# Patient Record
Sex: MALE | Race: WHITE | Employment: OTHER | URBAN - METROPOLITAN AREA
[De-identification: names, ages, dates, MRNs, and addresses within clinical notes are randomized per-mention and may not be internally consistent; named-entity substitution may affect disease eponyms.]

---

## 2019-07-09 ENCOUNTER — ANESTHESIA EVENT (OUTPATIENT)
Dept: SURGERY | Facility: CLINIC | Age: 84
End: 2019-07-09
Payer: COMMERCIAL

## 2019-07-09 ENCOUNTER — ANESTHESIA (OUTPATIENT)
Dept: SURGERY | Facility: CLINIC | Age: 84
End: 2019-07-09
Payer: COMMERCIAL

## 2019-07-09 ENCOUNTER — APPOINTMENT (OUTPATIENT)
Dept: GENERAL RADIOLOGY | Facility: CLINIC | Age: 84
End: 2019-07-09
Attending: UROLOGY
Payer: COMMERCIAL

## 2019-07-09 ENCOUNTER — HOSPITAL ENCOUNTER (OUTPATIENT)
Facility: CLINIC | Age: 84
Discharge: HOME | End: 2019-07-09
Attending: UROLOGY | Admitting: UROLOGY
Payer: COMMERCIAL

## 2019-07-09 VITALS
RESPIRATION RATE: 18 BRPM | HEIGHT: 72 IN | BODY MASS INDEX: 23.04 KG/M2 | TEMPERATURE: 96.7 F | HEART RATE: 55 BPM | OXYGEN SATURATION: 98 % | DIASTOLIC BLOOD PRESSURE: 77 MMHG | WEIGHT: 170.1 LBS | SYSTOLIC BLOOD PRESSURE: 167 MMHG

## 2019-07-09 DIAGNOSIS — N20.1 BILATERAL URETERAL CALCULI: Primary | ICD-10-CM

## 2019-07-09 PROCEDURE — 25000128 H RX IP 250 OP 636: Performed by: NURSE ANESTHETIST, CERTIFIED REGISTERED

## 2019-07-09 PROCEDURE — 71000027 ZZH RECOVERY PHASE 2 EACH 15 MINS: Performed by: UROLOGY

## 2019-07-09 PROCEDURE — 40000170 ZZH STATISTIC PRE-PROCEDURE ASSESSMENT II: Performed by: UROLOGY

## 2019-07-09 PROCEDURE — 36000093 ZZH SURGERY LEVEL 4 1ST 30 MIN: Performed by: UROLOGY

## 2019-07-09 PROCEDURE — 36000063 ZZH SURGERY LEVEL 4 EA 15 ADDTL MIN: Performed by: UROLOGY

## 2019-07-09 PROCEDURE — 88300 SURGICAL PATH GROSS: CPT | Performed by: UROLOGY

## 2019-07-09 PROCEDURE — 37000009 ZZH ANESTHESIA TECHNICAL FEE, EACH ADDTL 15 MIN: Performed by: UROLOGY

## 2019-07-09 PROCEDURE — 27210794 ZZH OR GENERAL SUPPLY STERILE: Performed by: UROLOGY

## 2019-07-09 PROCEDURE — 71000013 ZZH RECOVERY PHASE 1 LEVEL 1 EA ADDTL HR: Performed by: UROLOGY

## 2019-07-09 PROCEDURE — 25800030 ZZH RX IP 258 OP 636: Performed by: NURSE ANESTHETIST, CERTIFIED REGISTERED

## 2019-07-09 PROCEDURE — 88300 SURGICAL PATH GROSS: CPT | Mod: 26 | Performed by: UROLOGY

## 2019-07-09 PROCEDURE — 25000566 ZZH SEVOFLURANE, EA 15 MIN: Performed by: UROLOGY

## 2019-07-09 PROCEDURE — 25000125 ZZHC RX 250: Performed by: NURSE ANESTHETIST, CERTIFIED REGISTERED

## 2019-07-09 PROCEDURE — 25000128 H RX IP 250 OP 636: Performed by: UROLOGY

## 2019-07-09 PROCEDURE — 25000125 ZZHC RX 250: Performed by: UROLOGY

## 2019-07-09 PROCEDURE — 25800030 ZZH RX IP 258 OP 636: Performed by: ANESTHESIOLOGY

## 2019-07-09 PROCEDURE — 40000277 XR SURGERY CARM FLUORO LESS THAN 5 MIN W STILLS

## 2019-07-09 PROCEDURE — 25800025 ZZH RX 258: Performed by: UROLOGY

## 2019-07-09 PROCEDURE — 82365 CALCULUS SPECTROSCOPY: CPT | Performed by: UROLOGY

## 2019-07-09 PROCEDURE — 37000008 ZZH ANESTHESIA TECHNICAL FEE, 1ST 30 MIN: Performed by: UROLOGY

## 2019-07-09 PROCEDURE — C1726 CATH, BAL DIL, NON-VASCULAR: HCPCS | Performed by: UROLOGY

## 2019-07-09 PROCEDURE — 71000012 ZZH RECOVERY PHASE 1 LEVEL 1 FIRST HR: Performed by: UROLOGY

## 2019-07-09 PROCEDURE — C1769 GUIDE WIRE: HCPCS | Performed by: UROLOGY

## 2019-07-09 RX ORDER — SODIUM CHLORIDE, SODIUM LACTATE, POTASSIUM CHLORIDE, CALCIUM CHLORIDE 600; 310; 30; 20 MG/100ML; MG/100ML; MG/100ML; MG/100ML
INJECTION, SOLUTION INTRAVENOUS CONTINUOUS PRN
Status: DISCONTINUED | OUTPATIENT
Start: 2019-07-09 | End: 2019-07-09

## 2019-07-09 RX ORDER — PROPOFOL 10 MG/ML
INJECTION, EMULSION INTRAVENOUS CONTINUOUS PRN
Status: DISCONTINUED | OUTPATIENT
Start: 2019-07-09 | End: 2019-07-09

## 2019-07-09 RX ORDER — ONDANSETRON 2 MG/ML
4 INJECTION INTRAMUSCULAR; INTRAVENOUS EVERY 30 MIN PRN
Status: DISCONTINUED | OUTPATIENT
Start: 2019-07-09 | End: 2019-07-09 | Stop reason: HOSPADM

## 2019-07-09 RX ORDER — FENTANYL CITRATE 50 UG/ML
INJECTION, SOLUTION INTRAMUSCULAR; INTRAVENOUS PRN
Status: DISCONTINUED | OUTPATIENT
Start: 2019-07-09 | End: 2019-07-09

## 2019-07-09 RX ORDER — ONDANSETRON 2 MG/ML
INJECTION INTRAMUSCULAR; INTRAVENOUS PRN
Status: DISCONTINUED | OUTPATIENT
Start: 2019-07-09 | End: 2019-07-09

## 2019-07-09 RX ORDER — SERTRALINE HYDROCHLORIDE 100 MG/1
100 TABLET, FILM COATED ORAL EVERY MORNING
COMMUNITY

## 2019-07-09 RX ORDER — DEXAMETHASONE SODIUM PHOSPHATE 4 MG/ML
INJECTION, SOLUTION INTRA-ARTICULAR; INTRALESIONAL; INTRAMUSCULAR; INTRAVENOUS; SOFT TISSUE PRN
Status: DISCONTINUED | OUTPATIENT
Start: 2019-07-09 | End: 2019-07-09

## 2019-07-09 RX ORDER — AMLODIPINE BESYLATE 10 MG/1
10 TABLET ORAL DAILY
COMMUNITY
End: 2020-07-17

## 2019-07-09 RX ORDER — PROPOFOL 10 MG/ML
INJECTION, EMULSION INTRAVENOUS PRN
Status: DISCONTINUED | OUTPATIENT
Start: 2019-07-09 | End: 2019-07-09

## 2019-07-09 RX ORDER — CEFAZOLIN SODIUM 2 G/100ML
2 INJECTION, SOLUTION INTRAVENOUS
Status: COMPLETED | OUTPATIENT
Start: 2019-07-09 | End: 2019-07-09

## 2019-07-09 RX ORDER — SODIUM CHLORIDE, SODIUM LACTATE, POTASSIUM CHLORIDE, CALCIUM CHLORIDE 600; 310; 30; 20 MG/100ML; MG/100ML; MG/100ML; MG/100ML
INJECTION, SOLUTION INTRAVENOUS CONTINUOUS
Status: DISCONTINUED | OUTPATIENT
Start: 2019-07-09 | End: 2019-07-09 | Stop reason: HOSPADM

## 2019-07-09 RX ORDER — ONDANSETRON 4 MG/1
4 TABLET, ORALLY DISINTEGRATING ORAL EVERY 30 MIN PRN
Status: DISCONTINUED | OUTPATIENT
Start: 2019-07-09 | End: 2019-07-09 | Stop reason: HOSPADM

## 2019-07-09 RX ORDER — NALOXONE HYDROCHLORIDE 0.4 MG/ML
.1-.4 INJECTION, SOLUTION INTRAMUSCULAR; INTRAVENOUS; SUBCUTANEOUS
Status: DISCONTINUED | OUTPATIENT
Start: 2019-07-09 | End: 2019-07-09 | Stop reason: HOSPADM

## 2019-07-09 RX ORDER — LIDOCAINE HYDROCHLORIDE 20 MG/ML
INJECTION, SOLUTION INFILTRATION; PERINEURAL PRN
Status: DISCONTINUED | OUTPATIENT
Start: 2019-07-09 | End: 2019-07-09

## 2019-07-09 RX ORDER — CIPROFLOXACIN 500 MG/1
500 TABLET, FILM COATED ORAL 2 TIMES DAILY
Qty: 14 TABLET | Refills: 0 | Status: SHIPPED | OUTPATIENT
Start: 2019-07-09 | End: 2019-11-18

## 2019-07-09 RX ORDER — MEPERIDINE HYDROCHLORIDE 25 MG/ML
12.5 INJECTION INTRAMUSCULAR; INTRAVENOUS; SUBCUTANEOUS
Status: DISCONTINUED | OUTPATIENT
Start: 2019-07-09 | End: 2019-07-09 | Stop reason: HOSPADM

## 2019-07-09 RX ORDER — TAMSULOSIN HYDROCHLORIDE 0.4 MG/1
0.4 CAPSULE ORAL DAILY
Status: ON HOLD | COMMUNITY
End: 2019-07-09

## 2019-07-09 RX ORDER — EPHEDRINE SULFATE 50 MG/ML
INJECTION, SOLUTION INTRAMUSCULAR; INTRAVENOUS; SUBCUTANEOUS PRN
Status: DISCONTINUED | OUTPATIENT
Start: 2019-07-09 | End: 2019-07-09

## 2019-07-09 RX ORDER — FENTANYL CITRATE 50 UG/ML
25-50 INJECTION, SOLUTION INTRAMUSCULAR; INTRAVENOUS
Status: DISCONTINUED | OUTPATIENT
Start: 2019-07-09 | End: 2019-07-09 | Stop reason: HOSPADM

## 2019-07-09 RX ORDER — DONEPEZIL HYDROCHLORIDE 10 MG/1
5 TABLET, FILM COATED ORAL EVERY MORNING
COMMUNITY

## 2019-07-09 RX ORDER — HYDROMORPHONE HYDROCHLORIDE 1 MG/ML
.3-.5 INJECTION, SOLUTION INTRAMUSCULAR; INTRAVENOUS; SUBCUTANEOUS EVERY 10 MIN PRN
Status: DISCONTINUED | OUTPATIENT
Start: 2019-07-09 | End: 2019-07-09 | Stop reason: HOSPADM

## 2019-07-09 RX ADMIN — ONDANSETRON 4 MG: 2 INJECTION INTRAMUSCULAR; INTRAVENOUS at 11:50

## 2019-07-09 RX ADMIN — SODIUM CHLORIDE, SODIUM LACTATE, POTASSIUM CHLORIDE, CALCIUM CHLORIDE: 600; 310; 30; 20 INJECTION, SOLUTION INTRAVENOUS at 11:49

## 2019-07-09 RX ADMIN — Medication 5 MG: at 11:51

## 2019-07-09 RX ADMIN — FENTANYL CITRATE 25 MCG: 50 INJECTION, SOLUTION INTRAMUSCULAR; INTRAVENOUS at 11:17

## 2019-07-09 RX ADMIN — CEFAZOLIN SODIUM 2 G: 2 INJECTION, SOLUTION INTRAVENOUS at 10:52

## 2019-07-09 RX ADMIN — LIDOCAINE HYDROCHLORIDE 40 MG: 20 INJECTION, SOLUTION INFILTRATION; PERINEURAL at 10:44

## 2019-07-09 RX ADMIN — PROPOFOL 100 MCG/KG/MIN: 10 INJECTION, EMULSION INTRAVENOUS at 10:47

## 2019-07-09 RX ADMIN — FENTANYL CITRATE 25 MCG: 50 INJECTION, SOLUTION INTRAMUSCULAR; INTRAVENOUS at 10:44

## 2019-07-09 RX ADMIN — Medication 10 MG: at 11:06

## 2019-07-09 RX ADMIN — SODIUM CHLORIDE, SODIUM LACTATE, POTASSIUM CHLORIDE, CALCIUM CHLORIDE: 600; 310; 30; 20 INJECTION, SOLUTION INTRAVENOUS at 10:38

## 2019-07-09 RX ADMIN — DEXAMETHASONE SODIUM PHOSPHATE 4 MG: 4 INJECTION, SOLUTION INTRA-ARTICULAR; INTRALESIONAL; INTRAMUSCULAR; INTRAVENOUS; SOFT TISSUE at 11:03

## 2019-07-09 RX ADMIN — Medication 10 MG: at 11:45

## 2019-07-09 RX ADMIN — SODIUM CHLORIDE, POTASSIUM CHLORIDE, SODIUM LACTATE AND CALCIUM CHLORIDE: 600; 310; 30; 20 INJECTION, SOLUTION INTRAVENOUS at 14:52

## 2019-07-09 RX ADMIN — PROPOFOL 100 MG: 10 INJECTION, EMULSION INTRAVENOUS at 10:44

## 2019-07-09 RX ADMIN — FENTANYL CITRATE 50 MCG: 50 INJECTION, SOLUTION INTRAMUSCULAR; INTRAVENOUS at 10:55

## 2019-07-09 SDOH — HEALTH STABILITY: MENTAL HEALTH: HOW OFTEN DO YOU HAVE A DRINK CONTAINING ALCOHOL?: NEVER

## 2019-07-09 ASSESSMENT — MIFFLIN-ST. JEOR: SCORE: 1484.57

## 2019-07-09 ASSESSMENT — ENCOUNTER SYMPTOMS: DYSRHYTHMIAS: 1

## 2019-07-09 ASSESSMENT — LIFESTYLE VARIABLES: TOBACCO_USE: 0

## 2019-07-09 NOTE — OR NURSING
PNDS met, po per I&O sheet. Pt dressed, up in recliner and transported to Phase 2. Pt's daughter who is a nurse is able to irrigate catheter if necessary.

## 2019-07-09 NOTE — ANESTHESIA CARE TRANSFER NOTE
Patient: jB Soares    Procedure(s):  CYSTOSCOPY BILATERAL URETEROSCOPY. HOLMIUM LASER, STONE REMOVAL    Diagnosis: URINARY INCONTINENCE AND LEFT RENAL STONES  Diagnosis Additional Information: No value filed.    Anesthesia Type:   General, LMA     Note:  Airway :Face Mask  Patient transferred to:PACU  Handoff Report: Identifed the Patient, Identified the Reponsible Provider, Reviewed the pertinent medical history, Discussed the surgical course, Reviewed Intra-OP anesthesia mangement and issues during anesthesia, Set expectations for post-procedure period and Allowed opportunity for questions and acknowledgement of understanding      Vitals: (Last set prior to Anesthesia Care Transfer)    CRNA VITALS  7/9/2019 1142 - 7/9/2019 1217      7/9/2019             NIBP:  140/75    NIBP Mean:  102                Electronically Signed By: BISI Landrum CRNA  July 9, 2019  12:17 PM

## 2019-07-09 NOTE — OP NOTE
[facility] Urology Brief Operative Note    Pre-operative diagnosis: Bilateral ureteral STONES   Post-operative diagnosis: Same   Procedure: Procedure(s):  CYSTOSCOPY BILATERAL URETEROSCOPY. HOLMIUM LASER, STONE REMOVAL   Surgeon: Shamar Barrow MD   Assistant(s): None   Anesthesia: General endotracheal anesthesia   Estimated blood loss: * No values recorded between 7/9/2019 10:58 AM and 7/9/2019 11:55 AM *   Total IV fluids: (See anesthesia record)   Blood transfusion: No transfusion was given during surgery   Total urine output: (See anesthesia record)   Drains: None   Specimens: stones   Implants: None   Findings: 3 stones rt ureter and one stone left ureter   Complications: None   Condition: Stable   Comments: See dictated operative report for full details

## 2019-07-09 NOTE — OR NURSING
Pt's wife Lizeth doesw not feel like she can irrigate catheter. She has a daughter who is a nurse, the wife is giving her a call.

## 2019-07-09 NOTE — OR NURSING
Discharge instructions given to Lizeth/wife regarding joyce cath care and changing from joyce cath bag to leg bag.  Call placed to Dr Barrow regarding amt of urine and it's triny appearance. Some bleeding around end of catheter where it meets meatus.

## 2019-07-09 NOTE — OR NURSING
Dr Barrow returned call regarding urine not draining from catheter.  Instructed to irrigate catheter. Irrigated times four with multiple small clots returned. Continue to monitor.

## 2019-07-09 NOTE — ANESTHESIA PREPROCEDURE EVALUATION
Anesthesia Pre-Procedure Evaluation    Patient: Bj Soares   MRN: [id number] : 1931          Preoperative Diagnosis: URINARY INCONTINENCE AND LEFT RENAL STONES    Procedure(s):  CYSTOSCOPY BILATERAL URETEROSCOPY. HOLMIUM LASER. BILATERAL STENT PLACEMENT    Past Medical History:   Diagnosis Date     Alzheimer disease      CKD (chronic kidney disease)      Dementia      Depression      First degree AV block      Hyperlipidemia      Hyperparathyroidism (H)      Hypertension      Nephrolithiasis      Sinus bradycardia      Umbilical hernia      Urine retention      Past Surgical History:   Procedure Laterality Date     AS ESOPHAGOSCOPY, DIAGNOSTIC       CATARACT IOL, RT/LT       TURP         Anesthesia Evaluation     . Pt has had prior anesthetic.     No history of anesthetic complications          ROS/MED HX    ENT/Pulmonary:      (-) tobacco use and sleep apnea   Neurologic:     (+)dementia,     Cardiovascular:     (+) hypertension----. : . . . :. dysrhythmias 1st Deg Heart Block, .       METS/Exercise Tolerance:  >4 METS   Hematologic:         Musculoskeletal:         GI/Hepatic:        (-) GERD and liver disease   Renal/Genitourinary:     (+) chronic renal disease, type: CRI, Nephrolithiasis ,       Endo:         Psychiatric:     (+) psychiatric history depression      Infectious Disease:         Malignancy:         Other:                          Physical Exam  Normal systems: cardiovascular and pulmonary    Airway   Mallampati: II  TM distance: >3 FB  Neck ROM: full    Dental   (+) upper dentures and lower dentures    Cardiovascular       Pulmonary             No results found for: WBC, HGB, HCT, PLT, CRP, SED, NA, POTASSIUM, CHLORIDE, CO2, BUN, CR, GLC, VIKY, PHOS, MAG, ALBUMIN, PROTTOTAL, ALT, AST, GGT, ALKPHOS, BILITOTAL, BILIDIRECT, LIPASE, AMYLASE, DANIELLE, PTT, INR, FIBR, TSH, T4, T3, HCG, HCGS, CKTOTAL, CKMB, TROPN    Preop Vitals  BP Readings from Last 3 Encounters:   19 154/59     Pulse Readings from Last 3 Encounters:   07/09/19 57      Resp Readings from Last 3 Encounters:   07/09/19 18    SpO2 Readings from Last 3 Encounters:   07/09/19 97%      Temp Readings from Last 1 Encounters:   07/09/19 36.2  C (97.1  F) (Oral)    Ht Readings from Last 1 Encounters:   07/09/19 1.829 m (6')      Wt Readings from Last 1 Encounters:   07/09/19 77.2 kg (170 lb 1.6 oz)    Estimated body mass index is 23.07 kg/m  as calculated from the following:    Height as of this encounter: 1.829 m (6').    Weight as of this encounter: 77.2 kg (170 lb 1.6 oz).       Anesthesia Plan      History & Physical Review  History and physical reviewed and following examination; no interval change.    ASA Status:  2 .    NPO Status:  > 8 hours    Plan for General and LMA with Intravenous induction. Maintenance will be Balanced.    PONV prophylaxis:  Ondansetron (or other 5HT-3) and Dexamethasone or Solumedrol       Postoperative Care  Postoperative pain management:  Multi-modal analgesia.      Consents  Anesthetic plan, risks, benefits and alternatives discussed with:  Patient..                 Tj Guzman MD

## 2019-07-09 NOTE — DISCHARGE INSTRUCTIONS
Same Day Surgery Discharge Instructions for  Sedation and General Anesthesia       It's not unusual to feel dizzy, light-headed or faint for up to 24 hours after surgery or while taking pain medication.  If you have these symptoms: sit for a few minutes before standing and have someone assist you when you get up to walk or use the bathroom.      You should rest and relax for the next 24 hours. We recommend you make arrangements to have an adult stay with you for at least 24 hours after your discharge.  Avoid hazardous and strenuous activity.      DO NOT DRIVE any vehicle or operate mechanical equipment for 24 hours following the end of your surgery.  Even though you may feel normal, your reactions may be affected by the medication you have received.      Do not drink alcoholic beverages for 24 hours following surgery.       Slowly progress to your regular diet as you feel able. It's not unusual to feel nauseated and/or vomit after receiving anesthesia.  If you develop these symptoms, drink clear liquids (apple juice, ginger ale, broth, 7-up, etc. ) until you feel better.  If your nausea and vomiting persists for 24 hours, please notify your surgeon.        All narcotic pain medications, along with inactivity and anesthesia, can cause constipation. Drinking plenty of liquids and increasing fiber intake will help.      For any questions of a medical nature, call your surgeon.      Do not make important decisions for 24 hours.      If you had general anesthesia, you may have a sore throat for a couple of days related to the breathing tube used during surgery.  You may use Cepacol lozenges to help with this discomfort.  If it worsens or if you develop a fever, contact your surgeon.       If you feel your pain is not well managed with the pain medications prescribed by your surgeon, please contact your surgeon's office to let them know so they can address your concerns.       Cystoscopy and  Holmium Laser Discharge  Instructions    Holmium laser lithotripsy was used to break up your kidney stone(s). It is normal to have visible blood in the urine, burning, urgency and frequency following this procedure. These symptoms may last for a few days to weeks.     Diet:    To help pass stone fragments and clear the blood out of the urine, it is important to drink 6-8 glasses of fluids per day at home - at least 3-4 glasses should be water.       Return to the diet that you were on before the procedure, unless you are given specific diet instructions.    Activity:    Walk short distances and increase as your strength allows.    You may climb stairs.    Do not do strenuous exercise or heavy lifting until approved by surgeon.    Do not drive while taking narcotic pain medications.    Bathing:    You may take a shower.           Call your physician if these signs/symptoms are present:    Pain that is not relieved by a short rest or ordered pain medications.    Temperature at or above 101.0 F or chills.    Inability or difficulty urinating.     Excessive blood in urine.  Any questions or concerns.      DISCHARGE INSTRUCTIONS FOR   CATHETER CARE AT HOME      .      Basic Catheter Care  1. Always wash hands before and after handling your catheter.  2. Use soap and water to wash the area around your catheter.  3. Do this procedure twice a day.  4. Proper cleansing will help keep the area from becoming irritated or infected.    Leg Bag  This is a small plastic bag that collects urine draining from your catheter and then strapped around your thigh. It will need to be emptied when the bag is 1/2 to 3/4 full.     Large Drainage Bag  1. This bag is larger than the leg bag and holds more urine.  It is to be used while at home, especially at night.    2. Before you go to bed, change the leg bag to the large drainage bag.  3. Pinch off the catheter with your fingers and swab the connection between the catheter and leg bag with alcohol  sponge.  4. Disconnect the leg bag and connect the large drainage bag to your catheter.  5. When you get into bed, arrange the drainage tubing so that it doesn t kink.  6. Be sure to keep the bag below the level of your bladder and allow enough slack for turning.    Cleaning Your Drainage Bags    1. Wash hands.  2. Using funnel or syringe, fill the bag half full with a solution of 1/2  vinegar and 1/2 water.  3. Shake bag, allowing mixture to cleanse inside of bag.  4. Empty out all vinegar and water mixture from your bag.  5. Hang bag to dry when not in use.  6. Clean your bags anytime you change them.      Helpful Hints  1. Always keep drainage bags below bladder level to insure adequate drainage.  2. Drink 4-6 glasses of water daily along with other fluids you normally drink to keep urine free of infection and / or clots.  3. If you notice no urine in your bag for 2 to 4 hours or you develop extreme discomfort in bladder area, your catheter maybe plugged.  Notify your doctor.  4. If you notice your urine becomes foul smelling and cloudy, notify your doctor.  Also notify your doctor if you develop fever or chills.  5. If you notice urine leaking around the outside of the catheter, check to be sure catheter or tubing is not kinked.  6. Don t use leg bag while in bed.

## 2019-07-09 NOTE — OR NURSING
Discharge instructions reviewed w/pt's wife, Puja. Puja verbalized understanding to RN. Pt discharge to home w/family.

## 2019-07-09 NOTE — OR NURSING
Pt confused to time, place and event.  Wife and Dr Barrow state this is his baseline.  Wife has POA and signed consent.

## 2019-07-09 NOTE — ANESTHESIA POSTPROCEDURE EVALUATION
Patient: Bj Soares    Procedure(s):  CYSTOSCOPY BILATERAL URETEROSCOPY. HOLMIUM LASER, STONE REMOVAL    Diagnosis:URINARY INCONTINENCE AND LEFT RENAL STONES  Diagnosis Additional Information: No value filed.    Anesthesia Type:  General, LMA    Note:  Anesthesia Post Evaluation    Patient location during evaluation: PACU  Patient participation: Able to fully participate in evaluation  Level of consciousness: awake and alert  Pain management: satisfactory to patient  Airway patency: patent  Cardiovascular status: hemodynamically stable  Respiratory status: acceptable and unassisted  Hydration status: balanced  PONV: none     Anesthetic complications: None          Last vitals:  Vitals:    07/09/19 1300 07/09/19 1315 07/09/19 1330   BP: 147/72 151/73 148/73   Pulse: 56 54 53   Resp: 12 11 11   Temp: 35.1  C (95.2  F) 35.4  C (95.7  F) 35.2  C (95.4  F)   SpO2: 97% 98% 98%         Electronically Signed By: Tj Guzman MD  July 9, 2019  1:38 PM

## 2019-07-10 LAB — COPATH REPORT: NORMAL

## 2019-07-14 LAB
APPEARANCE STONE: NORMAL
COMPN STONE: NORMAL
NUMBER STONE: 1
SIZE STONE: > 9 MM
WT STONE: 402 MG

## 2019-07-29 NOTE — OP NOTE
Procedure Date: 07/09/2019      PREOPERATIVE DIAGNOSES:  Bilateral ureteral stones.      POSTOPERATIVE DIAGNOSES:  Bilateral ureteral stones.      PROCEDURES:  Cystoscopy, bilateral ureteroscopy, holmium laser lithotripsy of bilateral  ureteral stones.   SURGEON:  Shamar Barrow MD      PREOPERATIVE STATUS:  Bj Soares is an 87-year-old male with bilateral hydronephrosis, BPH with obstruction and bilateral ureteral stones.  He comes in for management of the ureteral stones.      FINDINGS:  Three stones in the right distal ureter measuring between 5 and 7 mm in size, 1 stone in the left distal ureter measuring 6-7 mm in size.      DESCRIPTION OF PROCEDURE:  The patient was identified and brought to the operating room.  After adequate general anesthesia, he was placed in dorsal lithotomy position and prepped and draped in the usual sterile fashion.  A timeout was undertaken.  Cystoscopy was performed.  He had an enlarged prostate which is partially obstructing.  Inspection of the bladder showed a markedly distended and trabeculated bladder. I was able to see the UO's and  I then passed a Sensor guidewire up the right ureter.  I then attempted to do ureteroscopy with the semirigid ureteroscope.  The ureteric orifice and tunnel were quite narrow.  I had to use a balloon dilator to dilate the ureteric orifice and tunnel.  Following this, I was able to advance the short 6.7 Monegasque ureteroscope.  The stones in the right distal ureter were readily visualized.  Using the holmium laser, the stones were fragmented and flushed out.  I then approached the left side.  Left ureteroscopy was performed.  The stone in the left distal ureter was readily visualized.  Again, using the holmium laser, the stone was fragmented and removed.  Initially I was planning on placing ureteral stents. However given the significantly dilated ureters all the way to the bladder, I felt the stents were not necessary.  The bladder was emptied and  the cystoscope was removed.  A Hewitt catheter was placed.  The patient tolerated the procedure well and was sent to the recovery room in stable condition.         SHAMAR BARROW MD             D: 2019   T: 2019   MT: MARCIA      Name:     THI HEALY   MRN:      [id number]        Account:        [id number]   :      1931           Procedure Date: 2019      Document: [id number]       cc: Shamar Barrow MD

## 2019-11-18 ENCOUNTER — HOSPITAL ENCOUNTER (INPATIENT)
Facility: CLINIC | Age: 84
LOS: 3 days | Discharge: NURSING, REHAB OR LONG-TERM CARE | DRG: 699 | End: 2019-11-21
Attending: EMERGENCY MEDICINE | Admitting: INTERNAL MEDICINE
Payer: COMMERCIAL

## 2019-11-18 DIAGNOSIS — R41.0 CONFUSION: ICD-10-CM

## 2019-11-18 DIAGNOSIS — N39.0 URINARY TRACT INFECTION ASSOCIATED WITH INDWELLING URETHRAL CATHETER, INITIAL ENCOUNTER (H): ICD-10-CM

## 2019-11-18 DIAGNOSIS — T83.511A URINARY TRACT INFECTION ASSOCIATED WITH INDWELLING URETHRAL CATHETER, INITIAL ENCOUNTER (H): ICD-10-CM

## 2019-11-18 LAB
ALBUMIN UR-MCNC: 300 MG/DL
ANION GAP SERPL CALCULATED.3IONS-SCNC: 6 MMOL/L (ref 3–14)
APPEARANCE UR: ABNORMAL
BASOPHILS # BLD AUTO: 0 10E9/L (ref 0–0.2)
BASOPHILS NFR BLD AUTO: 0.3 %
BILIRUB UR QL STRIP: NEGATIVE
BUN SERPL-MCNC: 43 MG/DL (ref 7–30)
CALCIUM SERPL-MCNC: 8.3 MG/DL (ref 8.5–10.1)
CHLORIDE SERPL-SCNC: 110 MMOL/L (ref 94–109)
CO2 SERPL-SCNC: 23 MMOL/L (ref 20–32)
COLOR UR AUTO: YELLOW
CREAT SERPL-MCNC: 2.64 MG/DL (ref 0.66–1.25)
DIFFERENTIAL METHOD BLD: NORMAL
EOSINOPHIL # BLD AUTO: 0.1 10E9/L (ref 0–0.7)
EOSINOPHIL NFR BLD AUTO: 1.3 %
ERYTHROCYTE [DISTWIDTH] IN BLOOD BY AUTOMATED COUNT: 14.1 % (ref 10–15)
GFR SERPL CREATININE-BSD FRML MDRD: 21 ML/MIN/{1.73_M2}
GLUCOSE SERPL-MCNC: 80 MG/DL (ref 70–99)
GLUCOSE UR STRIP-MCNC: NEGATIVE MG/DL
HCT VFR BLD AUTO: 41 % (ref 40–53)
HGB BLD-MCNC: 13.9 G/DL (ref 13.3–17.7)
HGB UR QL STRIP: ABNORMAL
IMM GRANULOCYTES # BLD: 0.1 10E9/L (ref 0–0.4)
IMM GRANULOCYTES NFR BLD: 1.8 %
KETONES UR STRIP-MCNC: NEGATIVE MG/DL
LEUKOCYTE ESTERASE UR QL STRIP: ABNORMAL
LYMPHOCYTES # BLD AUTO: 1.2 10E9/L (ref 0.8–5.3)
LYMPHOCYTES NFR BLD AUTO: 15.6 %
MCH RBC QN AUTO: 29.7 PG (ref 26.5–33)
MCHC RBC AUTO-ENTMCNC: 33.9 G/DL (ref 31.5–36.5)
MCV RBC AUTO: 88 FL (ref 78–100)
MONOCYTES # BLD AUTO: 0.8 10E9/L (ref 0–1.3)
MONOCYTES NFR BLD AUTO: 10.1 %
NEUTROPHILS # BLD AUTO: 5.7 10E9/L (ref 1.6–8.3)
NEUTROPHILS NFR BLD AUTO: 70.9 %
NITRATE UR QL: NEGATIVE
NRBC # BLD AUTO: 0 10*3/UL
NRBC BLD AUTO-RTO: 0 /100
PH UR STRIP: 6 PH (ref 5–7)
PLATELET # BLD AUTO: 153 10E9/L (ref 150–450)
POTASSIUM SERPL-SCNC: 4.6 MMOL/L (ref 3.4–5.3)
RBC # BLD AUTO: 4.68 10E12/L (ref 4.4–5.9)
RBC #/AREA URNS AUTO: 56 /HPF (ref 0–2)
SODIUM SERPL-SCNC: 139 MMOL/L (ref 133–144)
SOURCE: ABNORMAL
SP GR UR STRIP: 1.02 (ref 1–1.03)
UROBILINOGEN UR STRIP-MCNC: NORMAL MG/DL (ref 0–2)
WBC # BLD AUTO: 8 10E9/L (ref 4–11)
WBC #/AREA URNS AUTO: >182 /HPF (ref 0–5)
WBC CLUMPS #/AREA URNS HPF: PRESENT /HPF

## 2019-11-18 PROCEDURE — 25000132 ZZH RX MED GY IP 250 OP 250 PS 637: Performed by: INTERNAL MEDICINE

## 2019-11-18 PROCEDURE — 87088 URINE BACTERIA CULTURE: CPT | Performed by: EMERGENCY MEDICINE

## 2019-11-18 PROCEDURE — 96365 THER/PROPH/DIAG IV INF INIT: CPT

## 2019-11-18 PROCEDURE — 87086 URINE CULTURE/COLONY COUNT: CPT | Performed by: EMERGENCY MEDICINE

## 2019-11-18 PROCEDURE — 12000000 ZZH R&B MED SURG/OB

## 2019-11-18 PROCEDURE — 99285 EMERGENCY DEPT VISIT HI MDM: CPT | Mod: 25

## 2019-11-18 PROCEDURE — 99223 1ST HOSP IP/OBS HIGH 75: CPT | Mod: AI | Performed by: INTERNAL MEDICINE

## 2019-11-18 PROCEDURE — 80048 BASIC METABOLIC PNL TOTAL CA: CPT | Performed by: EMERGENCY MEDICINE

## 2019-11-18 PROCEDURE — 81001 URINALYSIS AUTO W/SCOPE: CPT | Performed by: EMERGENCY MEDICINE

## 2019-11-18 PROCEDURE — 87186 SC STD MICRODIL/AGAR DIL: CPT | Performed by: EMERGENCY MEDICINE

## 2019-11-18 PROCEDURE — 25800030 ZZH RX IP 258 OP 636: Performed by: INTERNAL MEDICINE

## 2019-11-18 PROCEDURE — 85025 COMPLETE CBC W/AUTO DIFF WBC: CPT | Performed by: EMERGENCY MEDICINE

## 2019-11-18 PROCEDURE — 25000128 H RX IP 250 OP 636: Performed by: EMERGENCY MEDICINE

## 2019-11-18 RX ORDER — SERTRALINE HYDROCHLORIDE 100 MG/1
100 TABLET, FILM COATED ORAL 2 TIMES DAILY
Status: DISCONTINUED | OUTPATIENT
Start: 2019-11-18 | End: 2019-11-21 | Stop reason: HOSPADM

## 2019-11-18 RX ORDER — LOPERAMIDE HYDROCHLORIDE 2 MG/1
2 TABLET ORAL DAILY PRN
COMMUNITY
End: 2020-07-17

## 2019-11-18 RX ORDER — CHOLECALCIFEROL (VITAMIN D3) 50 MCG
1 TABLET ORAL DAILY
COMMUNITY
End: 2020-07-17

## 2019-11-18 RX ORDER — LOPERAMIDE HCL 2 MG
2 CAPSULE ORAL DAILY PRN
Status: DISCONTINUED | OUTPATIENT
Start: 2019-11-18 | End: 2019-11-21 | Stop reason: HOSPADM

## 2019-11-18 RX ORDER — ONDANSETRON 4 MG/1
4 TABLET, ORALLY DISINTEGRATING ORAL EVERY 6 HOURS PRN
Status: DISCONTINUED | OUTPATIENT
Start: 2019-11-18 | End: 2019-11-21 | Stop reason: HOSPADM

## 2019-11-18 RX ORDER — AMLODIPINE BESYLATE 10 MG/1
10 TABLET ORAL DAILY
Status: DISCONTINUED | OUTPATIENT
Start: 2019-11-19 | End: 2019-11-21 | Stop reason: HOSPADM

## 2019-11-18 RX ORDER — LIDOCAINE 40 MG/G
CREAM TOPICAL
Status: DISCONTINUED | OUTPATIENT
Start: 2019-11-18 | End: 2019-11-21 | Stop reason: HOSPADM

## 2019-11-18 RX ORDER — CEFTRIAXONE 1 G/1
1 INJECTION, POWDER, FOR SOLUTION INTRAMUSCULAR; INTRAVENOUS EVERY 24 HOURS
Status: DISCONTINUED | OUTPATIENT
Start: 2019-11-19 | End: 2019-11-21

## 2019-11-18 RX ORDER — PROCHLORPERAZINE 25 MG
12.5 SUPPOSITORY, RECTAL RECTAL EVERY 12 HOURS PRN
Status: DISCONTINUED | OUTPATIENT
Start: 2019-11-18 | End: 2019-11-21 | Stop reason: HOSPADM

## 2019-11-18 RX ORDER — ONDANSETRON 2 MG/ML
4 INJECTION INTRAMUSCULAR; INTRAVENOUS EVERY 6 HOURS PRN
Status: DISCONTINUED | OUTPATIENT
Start: 2019-11-18 | End: 2019-11-21 | Stop reason: HOSPADM

## 2019-11-18 RX ORDER — DONEPEZIL HYDROCHLORIDE 10 MG/1
10 TABLET, FILM COATED ORAL DAILY
Status: DISCONTINUED | OUTPATIENT
Start: 2019-11-18 | End: 2019-11-21 | Stop reason: HOSPADM

## 2019-11-18 RX ORDER — CEFTRIAXONE 1 G/1
1 INJECTION, POWDER, FOR SOLUTION INTRAMUSCULAR; INTRAVENOUS ONCE
Status: COMPLETED | OUTPATIENT
Start: 2019-11-18 | End: 2019-11-18

## 2019-11-18 RX ORDER — ACETAMINOPHEN 325 MG/1
650 TABLET ORAL EVERY 4 HOURS PRN
Status: DISCONTINUED | OUTPATIENT
Start: 2019-11-18 | End: 2019-11-21 | Stop reason: HOSPADM

## 2019-11-18 RX ORDER — PROCHLORPERAZINE MALEATE 5 MG
5 TABLET ORAL EVERY 6 HOURS PRN
Status: DISCONTINUED | OUTPATIENT
Start: 2019-11-18 | End: 2019-11-21 | Stop reason: HOSPADM

## 2019-11-18 RX ORDER — SODIUM CHLORIDE 9 MG/ML
INJECTION, SOLUTION INTRAVENOUS CONTINUOUS
Status: DISCONTINUED | OUTPATIENT
Start: 2019-11-18 | End: 2019-11-19

## 2019-11-18 RX ORDER — ACETAMINOPHEN 650 MG/1
650 SUPPOSITORY RECTAL EVERY 4 HOURS PRN
Status: DISCONTINUED | OUTPATIENT
Start: 2019-11-18 | End: 2019-11-21 | Stop reason: HOSPADM

## 2019-11-18 RX ORDER — NALOXONE HYDROCHLORIDE 0.4 MG/ML
.1-.4 INJECTION, SOLUTION INTRAMUSCULAR; INTRAVENOUS; SUBCUTANEOUS
Status: DISCONTINUED | OUTPATIENT
Start: 2019-11-18 | End: 2019-11-21 | Stop reason: HOSPADM

## 2019-11-18 RX ADMIN — DONEPEZIL HYDROCHLORIDE 10 MG: 10 TABLET ORAL at 22:51

## 2019-11-18 RX ADMIN — SODIUM CHLORIDE, PRESERVATIVE FREE: 5 INJECTION INTRAVENOUS at 22:51

## 2019-11-18 RX ADMIN — SERTRALINE HYDROCHLORIDE 100 MG: 100 TABLET ORAL at 22:51

## 2019-11-18 RX ADMIN — CEFTRIAXONE SODIUM 1 G: 1 INJECTION, POWDER, FOR SOLUTION INTRAMUSCULAR; INTRAVENOUS at 19:25

## 2019-11-18 RX ADMIN — Medication 1 LOZENGE: at 23:43

## 2019-11-18 ASSESSMENT — ENCOUNTER SYMPTOMS
VOMITING: 0
WEAKNESS: 0
CONFUSION: 1
NAUSEA: 0

## 2019-11-18 ASSESSMENT — MIFFLIN-ST. JEOR
SCORE: 1485.92
SCORE: 1515.86

## 2019-11-18 NOTE — ED TRIAGE NOTES
"Has a catheter and has been \"messing with it\" per his daughter. Has had it since June presumably for retention. Also has dementia. Today joyce bag was \"missing\" when he got up sleeping. Wife who is caregiver is recently in hospital. Has had increased confusion lately and concern is for UTI  "

## 2019-11-18 NOTE — LETTER
Transition Communication Hand-off for Care Transitions to Next Level of Care Provider    Name: Bj Soares  : 1931  MRN #: [id number]  Primary Care Provider: Zelalem Hernandez     Primary Clinic: [address] [facility] [address]     Reason for Hospitalization:  Confusion [R41.0]  Urinary tract infection associated with indwelling urethral catheter, initial encounter (H) [T83.511A, N39.0]  Admit Date/Time: 2019  6:46 PM  Discharge Date: 2019    Payor Source: Payor: [facility] / Plan: [employer] MEDICARE NON [facility] PARTNERS / Product Type: HMO /     Readmission Assessment Measure (EVELIO) Risk Score/category: Average           Reason for Communication Hand-off Referral: Other Discharging to [facility]      Tash Siddiqi RN    AVS/Discharge Summary is the source of truth; this is a helpful guide for improved communication of patient story

## 2019-11-19 ENCOUNTER — APPOINTMENT (OUTPATIENT)
Dept: PHYSICAL THERAPY | Facility: CLINIC | Age: 84
DRG: 699 | End: 2019-11-19
Attending: INTERNAL MEDICINE
Payer: COMMERCIAL

## 2019-11-19 LAB
ANION GAP SERPL CALCULATED.3IONS-SCNC: 6 MMOL/L (ref 3–14)
BUN SERPL-MCNC: 40 MG/DL (ref 7–30)
CALCIUM SERPL-MCNC: 8.5 MG/DL (ref 8.5–10.1)
CHLORIDE SERPL-SCNC: 111 MMOL/L (ref 94–109)
CO2 SERPL-SCNC: 24 MMOL/L (ref 20–32)
CREAT SERPL-MCNC: 2.67 MG/DL (ref 0.66–1.25)
GFR SERPL CREATININE-BSD FRML MDRD: 20 ML/MIN/{1.73_M2}
GLUCOSE SERPL-MCNC: 78 MG/DL (ref 70–99)
POTASSIUM SERPL-SCNC: 4.4 MMOL/L (ref 3.4–5.3)
SODIUM SERPL-SCNC: 141 MMOL/L (ref 133–144)

## 2019-11-19 PROCEDURE — 99232 SBSQ HOSP IP/OBS MODERATE 35: CPT | Performed by: INTERNAL MEDICINE

## 2019-11-19 PROCEDURE — 97161 PT EVAL LOW COMPLEX 20 MIN: CPT | Mod: GP

## 2019-11-19 PROCEDURE — 99207 ZZC CDG-MDM COMPONENT: MEETS MODERATE - UP CODED: CPT | Performed by: INTERNAL MEDICINE

## 2019-11-19 PROCEDURE — 12000000 ZZH R&B MED SURG/OB

## 2019-11-19 PROCEDURE — 97530 THERAPEUTIC ACTIVITIES: CPT | Mod: GP

## 2019-11-19 PROCEDURE — 80048 BASIC METABOLIC PNL TOTAL CA: CPT | Performed by: INTERNAL MEDICINE

## 2019-11-19 PROCEDURE — 97116 GAIT TRAINING THERAPY: CPT | Mod: GP

## 2019-11-19 PROCEDURE — 36415 COLL VENOUS BLD VENIPUNCTURE: CPT | Performed by: INTERNAL MEDICINE

## 2019-11-19 PROCEDURE — 25000132 ZZH RX MED GY IP 250 OP 250 PS 637: Performed by: INTERNAL MEDICINE

## 2019-11-19 PROCEDURE — 25000128 H RX IP 250 OP 636: Performed by: INTERNAL MEDICINE

## 2019-11-19 RX ADMIN — DONEPEZIL HYDROCHLORIDE 10 MG: 10 TABLET ORAL at 21:38

## 2019-11-19 RX ADMIN — SERTRALINE HYDROCHLORIDE 100 MG: 100 TABLET ORAL at 21:38

## 2019-11-19 RX ADMIN — AMLODIPINE BESYLATE 10 MG: 10 TABLET ORAL at 08:35

## 2019-11-19 RX ADMIN — CEFTRIAXONE SODIUM 1 G: 1 INJECTION, POWDER, FOR SOLUTION INTRAMUSCULAR; INTRAVENOUS at 21:37

## 2019-11-19 RX ADMIN — SERTRALINE HYDROCHLORIDE 100 MG: 100 TABLET ORAL at 08:35

## 2019-11-19 ASSESSMENT — ACTIVITIES OF DAILY LIVING (ADL)
ADLS_ACUITY_SCORE: 14
ADLS_ACUITY_SCORE: 13
ADLS_ACUITY_SCORE: 14

## 2019-11-19 NOTE — PHARMACY-ADMISSION MEDICATION HISTORY
Pharmacy Medication History  Admission medication history interview status for the 11/18/2019  admission is complete. See EPIC admission navigator for prior to admission medications     Medication history sources: Patient's family/friend (daughter)  Medication history source reliability: Good  Adherence assessment: Good    Significant changes made to the medication list:  Removed:   - Tamsulosin 0.4mg: patient no longer takes it per his daughter    Added:  - Vitamin D3 2000 international unit(s): his daughter reports that he takes vitamin D3, but was not able to take since last Friday due to stomach problem  - Tylenol PM: his daughter reports that he took 1 tablet last night  - Loperamide: his daughter reports that he took 1 tablet last night    Modified:  - Donepezil: per daughter, dose was increased from 5 mg to 10 mg once daily  - Sertraline: per daughter, patient takes 1 tablet by mouth twice daily (used to take 1 tablet by mouth once daily).       Additional medication history information:   None    Medication reconciliation completed by provider prior to medication history? No    Time spent in this activity: 10 minutes      Prior to Admission medications    Medication Sig Last Dose Taking? Auth Provider   amLODIPine (NORVASC) 10 MG tablet Take 10 mg by mouth daily 11/18/2019 at AM Yes Reported, Patient   diphenhydrAMINE-acetaminophen (TYLENOL PM)  MG tablet Take 1 tablet by mouth nightly as needed for sleep 11/17/2019 at PM Yes Unknown, Entered By History   donepezil (ARICEPT) 10 MG tablet Take 10 mg by mouth daily  11/17/2019 at PM Yes Reported, Patient   loperamide (IMODIUM A-D) 2 MG tablet Take 2 mg by mouth daily as needed for diarrhea 11/17/2019 at PM Yes Unknown, Entered By History   sertraline (ZOLOFT) 100 MG tablet Take 100 mg by mouth 2 times daily  11/18/2019 at AM Yes Reported, Patient   vitamin D3 (CHOLECALCIFEROL) 2000 units (50 mcg) tablet Take 1 tablet by mouth daily  11/15/2019 at AM Yes  Unknown, Entered By History

## 2019-11-19 NOTE — ED NOTES
"[facility]  ED Nurse Handoff Report    ED Chief complaint: Catheter Problem      ED Diagnosis:   Final diagnoses:   Urinary tract infection associated with indwelling urethral catheter, initial encounter (H)   Confusion       Code Status: See hospitalist note.    Allergies: No Known Allergies    Activity level - Baseline/Home:  Independent  Activity Level - Current:   Unable to Assess    Patient's Preferred language: English   Needed?: No    Isolation: No  Infection: Not Applicable  Bariatric?: No    Vital Signs:   Vitals:    11/18/19 1728 11/18/19 2020   BP: (!) 143/60 (!) 143/70   Pulse: 55 54   Resp: 16    Temp: 98.1  F (36.7  C)    TempSrc: Temporal    SpO2: 99% 95%   Weight: 79.2 kg (174 lb 9.6 oz)    Height: 1.854 m (6' 1\")        Cardiac Rhythm: ,        Pain level: 0-10 Pain Scale: 0    Is this patient confused?: Yes Dementia at baseline. It is not yet severe.   Does this patient have a guardian?  No         If yes, is there guardianship documents in the Epic \"Code/ACP\" activity?  No         Guardian Notified?  N/A  [place] - Suicide Severity Rating Scale Completed?  No, secondary to passing PSS  If yes, what color did the patient score?  N/A    Patient Report: Initial Complaint: Pt presents with increased confusion and pulling on his indwelling catheter.  Focused Assessment: Pt alert and oriented, follows commands. Daughter reports he has seemed slightly more confused. Indwelling joyce catheter in place on arrival with elpidio urine.   Tests Performed:   Results for orders placed or performed during the hospital encounter of 11/18/19   UA reflex to Microscopic     Status: Abnormal   Result Value Ref Range    Color Urine Yellow     Appearance Urine Cloudy     Glucose Urine Negative NEG^Negative mg/dL    Bilirubin Urine Negative NEG^Negative    Ketones Urine Negative NEG^Negative mg/dL    Specific Gravity Urine 1.016 1.003 - 1.035    Blood Urine Moderate (A) NEG^Negative    pH Urine " 6.0 5.0 - 7.0 pH    Protein Albumin Urine 300 (A) NEG^Negative mg/dL    Urobilinogen mg/dL Normal 0.0 - 2.0 mg/dL    Nitrite Urine Negative NEG^Negative    Leukocyte Esterase Urine Large (A) NEG^Negative    Source Catheterized Urine     RBC Urine 56 (H) 0 - 2 /HPF    WBC Urine >182 (H) 0 - 5 /HPF    WBC Clumps Present (A) NEG^Negative /HPF   CBC with platelets differential     Status: None   Result Value Ref Range    WBC 8.0 4.0 - 11.0 10e9/L    RBC Count 4.68 4.4 - 5.9 10e12/L    Hemoglobin 13.9 13.3 - 17.7 g/dL    Hematocrit 41.0 40.0 - 53.0 %    MCV 88 78 - 100 fl    MCH 29.7 26.5 - 33.0 pg    MCHC 33.9 31.5 - 36.5 g/dL    RDW 14.1 10.0 - 15.0 %    Platelet Count 153 150 - 450 10e9/L    Diff Method Automated Method     % Neutrophils 70.9 %    % Lymphocytes 15.6 %    % Monocytes 10.1 %    % Eosinophils 1.3 %    % Basophils 0.3 %    % Immature Granulocytes 1.8 %    Nucleated RBCs 0 0 /100    Absolute Neutrophil 5.7 1.6 - 8.3 10e9/L    Absolute Lymphocytes 1.2 0.8 - 5.3 10e9/L    Absolute Monocytes 0.8 0.0 - 1.3 10e9/L    Absolute Eosinophils 0.1 0.0 - 0.7 10e9/L    Absolute Basophils 0.0 0.0 - 0.2 10e9/L    Abs Immature Granulocytes 0.1 0 - 0.4 10e9/L    Absolute Nucleated RBC 0.0    Basic metabolic panel     Status: Abnormal   Result Value Ref Range    Sodium 139 133 - 144 mmol/L    Potassium 4.6 3.4 - 5.3 mmol/L    Chloride 110 (H) 94 - 109 mmol/L    Carbon Dioxide 23 20 - 32 mmol/L    Anion Gap 6 3 - 14 mmol/L    Glucose 80 70 - 99 mg/dL    Urea Nitrogen 43 (H) 7 - 30 mg/dL    Creatinine 2.64 (H) 0.66 - 1.25 mg/dL    GFR Estimate 21 (L) >60 mL/min/[1.73_m2]    GFR Estimate If Black 24 (L) >60 mL/min/[1.73_m2]    Calcium 8.3 (L) 8.5 - 10.1 mg/dL       Abnormal Results:   Abnormal Labs Reviewed   URINE MACROSCOPIC WITH REFLEX TO MICRO - Abnormal; Notable for the following components:       Result Value    Blood Urine Moderate (*)     Protein Albumin Urine 300 (*)     Leukocyte Esterase Urine Large (*)     RBC  Urine 56 (*)     WBC Urine >182 (*)     WBC Clumps Present (*)     All other components within normal limits   BASIC METABOLIC PANEL - Abnormal; Notable for the following components:    Chloride 110 (*)     Urea Nitrogen 43 (*)     Creatinine 2.64 (*)     GFR Estimate 21 (*)     GFR Estimate If Black 24 (*)     Calcium 8.3 (*)     All other components within normal limits       Treatments provided: Replaced existing joyce catheter, ABX.    Family Comments: Daughter at bedside.    OBS brochure/video discussed/provided to patient/family: N/A           ED Medications:   Medications   cefTRIAXone (ROCEPHIN) 1 g vial to attach to  mL bag for ADULTS or NS 50 mL bag for PEDS (0 g Intravenous Stopped 11/18/19 1955)       Drips infusing?:  No    For the majority of the shift this patient was Green.   Interventions performed were Explanation, warm blankets.    Severe Sepsis OR Septic Shock Diagnosis Present: No    To be done/followed up on inpatient unit:      ED NURSE PHONE NUMBER: [phone]

## 2019-11-19 NOTE — PLAN OF CARE
Discharge Planner PT   Patient plan for discharge: not stated. Per dtr pt's wife DCing to home tomorrow. Still figuring out plan   Current status:     Eval complete, treatment indicated. Edu PT role and POC. Dtr provided hx information.     Pt is SBA bed mobility. CGA STS no assistive device. Assisted into BR with CGA-SBA. Pt ambulated 400' no assistive device, CGA > SBA, improved stability with distance. Inc lateral path deviation with head turns and when distracted. NO overt LOB. End of session pt left seated in chair with needs in reach.     Barriers to return to prior living situation: Will need assist with joyce management and meds as receiving prior, wife in hospital as well, stairs to bedroom, impaired high level balance  Recommendations for discharge: home if family can assist with cares wife was previously assisting with. Seems near baseline for functional mobility   Rationale for recommendations: Pt mobilizing well, appears close to baseline. WIll benefit from continued PT to progress high level balance and IND with gait and transfers. Predict pt will return to baseline functional mobility by discharge          Entered by: Manisha Em 11/19/2019 4:52 PM

## 2019-11-19 NOTE — PROGRESS NOTES
RECEIVING UNIT ED HANDOFF REVIEW    ED Nurse Handoff Report was reviewed by: Michelle Black RN on November 18, 2019 at 9:42 PM

## 2019-11-19 NOTE — ED PROVIDER NOTES
History     Chief Complaint:    Catheter Problem      HPI   Bj Soares is a 88 year old male, with a history of dementia and urine retention, who presents to the ED for evaluation of a catheter problem. The patient's daughter states that the patient has been messing with his catheter for the past few days. Over the past couple of days, the patient has been more confused per his daughter and she is concerned for UTI. Overnight last night, the daughter states that his joyce bag was missing temporarily because he likely took it off but they later found it. The daughter also notes some bilateral foot swelling. The patient otherwise denies any pain, nausea, vomiting, or weakness. Of note, the patient's daughter states that his wife, and primary caregiver, has been in the hospital for about a week after a fall and his daughters have been taking care of him at home. HPI limited secondary to dementia.     Allergies:  The patient has no known drug allergies.    Medications:    Norvasc  Aricept  Zoloft    Past Medical History:    Alzheimer disease  CKD  Dementia  Depression  First degree AV block  HLD  Hyperparathyroidism  HTN  Kidney stones  Sinus bradycardia  Umbilical hernia  Urine retention  Obstruction of kidney/bladder    Past Surgical History:    Cataract IOL  ESWL, insert stent ureter(s)  TURP    Family History:    No past pertinent family history.    Social History:  Former smoker.  Negative for alcohol use.  Denies drug use.   Marital Status:      Presents with his daughter, Maryellen.     Review of Systems   Unable to perform ROS: Dementia   Cardiovascular: Positive for leg swelling.   Gastrointestinal: Negative for nausea and vomiting.   Neurological: Negative for weakness.   Psychiatric/Behavioral: Positive for confusion.   All other systems reviewed and are negative.    Physical Exam   First Vitals:  BP: (!) 143/60  Pulse: 55  Heart Rate: 55  Temp: 98.1  F (36.7  C)  Resp: 16  Height: 185.4 cm  "(6' 1\")  Weight: 79.2 kg (174 lb 9.6 oz)  SpO2: 99 %      Physical Exam  Constitutional: Well developed, nontox appearance  Head: Atraumatic.   Mouth/Throat: Oropharynx is clear and moist.   Neck:  no stridor  Eyes: no scleral icterus  Cardiovascular: Reg bradycardia, 2+ bilat radial pulses  Pulmonary/Chest: nml resp effort, Clear BS bilat  Abdominal: ND, +BS, soft, NT, no rebound or guarding   : no CVA tenderness bilat, joyce catheter in place, urine in bag  Ext: Warm, well perfused, no edema  Neurological: A&Ox2, symmetric facies, moves ext x4  Skin: Skin is warm and dry.   Psychiatric: Behavior is normal. Thought content normal.   Nursing note and vitals reviewed.    Emergency Department Course   Laboratory:  CBC: WBC: 8.0, HGB: 13.9, PLT: 153  BMP: Chloride: 110 (H), BUN: 43 (H), Creatinine: 2.54 (H), GFR estimate: 21 (L), Calcium: 8.3 (L), o/w WNL     UA with Microscopic: blood: moderate, protein albumin: 300, Leukocyte esterase: large, RBC: 56, WBC: >182, WBC clumps: present, o/w WNL  Urine culture: pending    Interventions:  1925 Rocephin 1 g IV    Emergency Department Course:  Nursing notes and vitals reviewed. (1857) I performed an exam of the patient as documented above.     The patient provided a urine sample here in the emergency department. This was sent for laboratory testing, findings above.     IV inserted. Medicine administered as documented above. Blood drawn. This was sent to the lab for further testing, results above.     I rechecked the patient and discussed the results of his workup thus far.     2035  I consulted with Dr. Mittal of the hospitalist services. They are in agreement to accept the patient for admission.    The patient had a Joyce catheter placed here in the emergency department without difficulty.    Findings and plan explained to the Patient who consents to admission. Discussed the patient with Dr. Mittal, who will admit the patient to a [facility] bed for further monitoring, " evaluation, and treatment.  Impression & Plan    Medical Decision Makin-year-old male presenting with Hewitt catheter dysfunction, increased confusion, denies weakness    Differential diagnosis includes catheter dysfunction, acute on chronic dementia, UTI, electrolyte abnormality, chronic kidney disease.  UA consistent with infection.  Given normal vital signs, no flank pain, infected stone seems less likely.  Hewitt catheter exchanged in the emergency department.  This patient has symptoms consistent with a urinary tract infection. There has been no fever, back/flank pain or significant abdominal pain.  There is no clinical evidence of pyelonephritis, appendicitis,  or diverticulitis therefore imaging deferred.  IV ceftriaxone given as noted above.  Given the patient's increased confusion and generalized weakness in addition to his and his family's input, patient admitted to the hospital service for further evaluation and management.  Patient and family counseled results, diagnosis and disposition prior to admission.  They are understanding and agreeable to plan.  Patient was subsequently admitted in stable condition.      Diagnosis:    ICD-10-CM    1. Urinary tract infection associated with indwelling urethral catheter, initial encounter (H) T83.511A CBC with platelets differential    N39.0 Basic metabolic panel   2. Confusion R41.0        Disposition:  Admitted to [facility]    Scribe Disclosure:  I,  Dusty Vences, am serving as a scribe on 2019 at 6:56 PM to personally document services performed by Domo Dickson MD based on my observations and the provider's statements to me.        Dusty Vences  2019    EMERGENCY DEPARTMENT       Domo Dickson MD  [id number]

## 2019-11-19 NOTE — PROGRESS NOTES
[facility]    Hospitalist Progress Note    Date of Service (when I saw the patient): 11/19/2019    Assessment & Plan   Mr. Soares is an 89 y/o male with PMHx remarkable for chronic indwelling joyce since summer 2019, CKD IV, dementia who presented to the hospital 2/2 confusion and urinary catheter issue    UTI, catheter related  H/o kidney stones and urinary retention, catheter since 7/2019. Daughters who assist with cares note that he has been much more confused than usual. Also noted strong smelling urine odor. No f/c, some dizziness. In ED afebrile, sl hypertensive. WBC at 8.0. UA grossly positive for infection  - urine culture pending  - continues on ceftriaxone 1 g q24 hours IV for now pending cultures  - discontinue IV fluids  - hopeful sensitivities to change to oral abx 11/20  - PT, OT assessment    Alzheimer dementia  Encephalopathy, likely 2/2 infection   [donepezil 10 mg daily, sertraline 100 mg BID]  Encephalopathy as above. Improving with treatment for UTI  - continue donepezil, sertraline    HTN  [amlodipine 10 mg daily]  Continue amlodipine with hold parameters    CKD IV  Baseline creatinine appears variable, from ~2.3 to 3.4. creatinine at the time of admission at 2.64  - avoid nephrotoxinx  - monitor creatinine with treatment    Chronic indwelling joyce  Needs to follow up with Dr. Barrow with urology re: regular catheter changes    FEN (fluids, electrolytes and nutrition): discontinue IV fluids, regular diet  Discussed with nursing.  DVT Prophylaxis: Pneumatic Compression Devices  Code Status: DNR/DNI    Disposition: Expected discharge in 1-2 days pending sensitivities for abx and improvement in infection    Anders Lopez MD  [phone] (P)  Text Page    Interval History   Overnight events reviewed. Denies cp/sob at this time. Generally oriented.     -Data reviewed today: I reviewed all new labs and imaging results over the last 24 hours. I personally reviewed no images or  EKG's today.    Physical Exam   Temp: 97.7  F (36.5  C) Temp src: Oral BP: (!) 144/63 Pulse: 57 Heart Rate: 60 Resp: 18 SpO2: 97 % O2 Device: None (Room air)    Vitals:    11/18/19 1728 11/18/19 2200   Weight: 79.2 kg (174 lb 9.6 oz) 76.2 kg (168 lb)     Vital Signs with Ranges  Temp:  [97.7  F (36.5  C)-98.4  F (36.9  C)] 97.7  F (36.5  C)  Pulse:  [54-61] 57  Heart Rate:  [55-60] 60  Resp:  [16-18] 18  BP: (142-160)/(60-72) 144/63  SpO2:  [94 %-99 %] 97 %  No intake/output data recorded.    Constitutional: Alert, oriented, perseverating on issues  Respiratory: Lungs clear to auscultation bilaterally, no wheezes, no crackles  Cardiovascular: Regular rate and rhythm, no murmurs  GI: Soft, non-tender, non-disteneded, good bowel sounds  Skin/Integumen: No erythema, cyanosis or edema  Other:      Medications     sodium chloride 75 mL/hr at 11/18/19 2251       amLODIPine  10 mg Oral Daily     cefTRIAXone  1 g Intravenous Q24H     donepezil  10 mg Oral Daily     sertraline  100 mg Oral BID     sodium chloride (PF)  3 mL Intracatheter Q8H       Data   Recent Labs   Lab 11/19/19  0733 11/18/19  1924   WBC  --  8.0   HGB  --  13.9   MCV  --  88   PLT  --  153    139   POTASSIUM 4.4 4.6   CHLORIDE 111* 110*   CO2 24 23   BUN 40* 43*   CR 2.67* 2.64*   ANIONGAP 6 6   VIKY 8.5 8.3*   GLC 78 80       No results found for this or any previous visit (from the past 24 hour(s)).

## 2019-11-19 NOTE — PROGRESS NOTES
11/19/19 1639   Quick Adds   Type of Visit Initial PT Evaluation   Living Environment   Lives With spouse   Living Arrangements house   Transportation Anticipated family or friend will provide   Living Environment Comment Pt lives in house full flight to bedroom. Dtr present to provide hx information   Self-Care   Usual Activity Tolerance good   Current Activity Tolerance moderate   Equipment Currently Used at Home none   Activity/Exercise/Self-Care Comment Pt is IND at baseline per dtr. Wife assists with emptying joyce as well as meds. Wife currently admitted to the hospital    Functional Level Prior   Ambulation 1-->assistive equipment   Transferring 0-->independent   Toileting 0-->independent   Bathing 0-->independent   Communication 0-->understands/communicates without difficulty   Cognition 1 - attention or memory deficits   Fall history within last six months no   Prior Functional Level Comment IND at baseline   General Information   Onset of Illness/Injury or Date of Surgery - Date 11/18/19   Referring Physician Anders Lopez MD   Pertinent History of Current Problem (include personal factors and/or comorbidities that impact the POC) Mr. Soares is an 87 y/o male with PMHx remarkable for chronic indwelling joyce since summer 2019, CKD IV, dementia who presented to the hospital 2/2 confusion and urinary catheter issue   Precautions/Limitations fall precautions   General Observations Of note pt's wife currently admitted to the hospital    Cognitive Status Examination   Orientation person;place   Cognitive Comment Baseline dementia.    Pain Assessment   Patient Currently in Pain No   Posture    Posture Forward head position   Range of Motion (ROM)   ROM Comment BLE WFL    Strength   Strength Comments BLE > 3/5 strength based on functional mobility    Bed Mobility   Bed Mobility Comments Supine > sit SBA   Transfer Skills   Transfer Comments STS with no AD CGA, braces LE against bed, unsteady  "  Gait   Gait Comments Pt initially demonstrates unsteady gait, no AD, no overt LOB, CGA provided   Balance   Balance Comments Pt demonstrates fair dynamic balance   Sensory Examination   Sensory Perception Comments Intact to light touch    General Therapy Interventions   Planned Therapy Interventions balance training;bed mobility training;gait training;neuromuscular re-education;ROM;strengthening;transfer training   Clinical Impression   Criteria for Skilled Therapeutic Intervention yes, treatment indicated   PT Diagnosis impaired IND with functional mobility    Influenced by the following impairments Medical condition, impaired high level balance, cognition   Functional limitations due to impairments Impaired gait stability and safety with transfers   Clinical Presentation Stable/Uncomplicated   Clinical Presentation Rationale good family support, clinical judgement   Clinical Decision Making (Complexity) Low complexity   Therapy Frequency Daily   Predicted Duration of Therapy Intervention (days/wks) 5 days   Anticipated Discharge Disposition Home with Assist   Risk & Benefits of therapy have been explained Yes   Patient, Family & other staff in agreement with plan of care Yes   [facility] AM-PAC  \"6 Clicks\" V.2 Basic Mobility Inpatient Short Form   1. Turning from your back to your side while in a flat bed without using bedrails? 4 - None   2. Moving from lying on your back to sitting on the side of a flat bed without using bedrails? 4 - None   3. Moving to and from a bed to a chair (including a wheelchair)? 3 - A Little   4. Standing up from a chair using your arms (e.g., wheelchair, or bedside chair)? 3 - A Little   5. To walk in hospital room? 3 - A Little   6. Climbing 3-5 steps with a railing? 3 - A Little   Basic Mobility Raw Score (Score out of 24.Lower scores equate to lower levels of function) 20   Total Evaluation Time   Total Evaluation Time (Minutes) 15     " Yes

## 2019-11-19 NOTE — H&P
Admitted:     11/18/2019      PRIMARY CARE PHYSICIAN:  Dr. Zelalem Hernandez.      CODE STATUS:  DNR/DNI, discussed with the patient's daughter.      CHIEF COMPLAINT:  Confusion and urinary catheter issue.      HISTORY OF PRESENT ILLNESS:  Mr. Soares is an 88-year-old male with a past medical history significant for chronic indwelling Hewitt catheter since sometime this summer, chronic kidney disease stage IV, dementia who presents to the Emergency Department with his daughter for the above concern.  History is obtained through discussion with the ED physician, the patient and his daughter at bedside.  The patient has a history of kidney stones and urinary retention with resultant damage to his kidneys and has had a urinary catheter since July.  The daughter is not his primary caregiver.  However, patient's wife who is, is currently in the hospital after falling off a ladder and breaking multiple ribs.  The 2 daughters have been taking care of him for the past few days and they have noted a few things recently.  They have noted that the patient is much more confused than typical on asking repetitive questions despite even his Alzheimer disease.  He has also been picking more at his catheter bag and disconnected the bag yesterday.  They have noted a very strong smelling urine odor.  The daughter is also unclear if the patient has followup with Urology since his hospital stay and is uncertain when or if, the Hewitt has been changed.  He has not been having fevers or chills.  He has been eating and drinking fairly well.  He has, however, been complaining of being dizzy but has not fallen.  No new medications.      In the Emergency Department, the patient was noted to have normal vitals, but had a UA that suggested infection.  Creatinine is slightly above his baseline.  The Hewitt catheter was exchanged and the patient was given a dose of ceftriaxone.      PAST MEDICAL HISTORY:   1.  Urinary retention with indwelling Hewitt  catheter since the summer of 2019.   2.  Chronic kidney disease, stage IV, with a baseline creatinine of roughly 2.5.   3.  Alzheimer's dementia.   4.  Hypertension.   5.  Kidney stones.   6.  Vitamin D deficiency.   7.  Dyslipidemia.   8.  Duodenal ulcer.   9.  Depression.      MEDICATIONS:    Medications Prior to Admission   Medication Sig Dispense Refill Last Dose     amLODIPine (NORVASC) 10 MG tablet Take 10 mg by mouth daily   11/18/2019 at AM     diphenhydrAMINE-acetaminophen (TYLENOL PM)  MG tablet Take 1 tablet by mouth nightly as needed for sleep   11/17/2019 at PM     donepezil (ARICEPT) 10 MG tablet Take 10 mg by mouth daily    11/17/2019 at PM     loperamide (IMODIUM A-D) 2 MG tablet Take 2 mg by mouth daily as needed for diarrhea   11/17/2019 at PM     sertraline (ZOLOFT) 100 MG tablet Take 100 mg by mouth 2 times daily    11/18/2019 at AM     vitamin D3 (CHOLECALCIFEROL) 2000 units (50 mcg) tablet Take 1 tablet by mouth daily    11/15/2019 at AM           SOCIAL HISTORY:  The patient denies any use of tobacco or alcohol, and typically lives at home with his wife.      FAMILY HISTORY:  Father had an MI and mother had cancer.      ALLERGIES:  NO KNOWN DRUG ALLERGIES.      REVIEW OF SYSTEMS:  The complete review of systems reviewed and negative, save for the pertinent positives recorded in HPI.      PHYSICAL EXAMINATION:   VITAL SIGNS:  Show blood pressure of 143/70, heart rate 54, respirations 16, satting 95% on room air, temperature 98.1 degrees Fahrenheit.   GENERAL:  The patient is lying in bed and appears fairly comfortable.   HEENT:  Pupils equal, reactive to light.  Extraocular muscle function intact.  No scleral icterus.  Oropharynx is clear.   NECK:  No lymphadenopathy or thyromegaly.   CARDIOVASCULAR:  Regular rate and rhythm without any murmur, rub or gallop.   PULMONARY:  Lungs are clear to auscultation bilaterally without wheeze or rhonchi.   GASTROINTESTINAL:  Positive bowel sounds,  soft, nontender and nondistended.   SKIN:  No rashes or lesions.   LYMPHATICS:  Trace edema of the bilateral lower extremities with slight prominence on the right, which is much improved than normal per the daughter.   NEUROLOGIC:  Cranial nerves II-XII are grossly intact.  No focal deficits.      LABORATORIES:  CBC shows WBC count 8.0 with left shift, hemoglobin 13.9 and platelets of 153.  Sodium 139, potassium 4.6, chloride 110, CO2 of 23, BUN 43, creatinine 2.64.  UA showing large leukocyte esterase, blood, over 182 WBCs, and WBC clumps.      ASSESSMENT AND PLAN:  Mr. Soares is an 88-year-old male with a past medical history significant for Alzheimer dementia, chronic kidney disease, chronic indwelling Hewitt catheter who presents to the Emergency Department with confusion and likely urinary tract infection.   1.  Urinary tract infection with encephalopathy:  This is associated with a chronic indwelling Hewitt catheter, which has been exchanged.  We will continue with ceftriaxone and await urine culture.   2.  Alzheimer's dementia with encephalopathy due to urinary tract infection:  Ii will continue with his Aricept and Zoloft and avoid any medications that would worsen his confusion.   3.  Hypertension:  We will continue with amlodipine and add hold parameters.   4.  Chronic kidney disease, stage III:  Creatinine is slightly above his baseline of 2.5.  I am going to give him a little IV fluids since he is likely a little dehydrated and plan to recheck BMP in the morning.   5.  Chronic indwelling Hewitt catheter:  Discussed with the patient's daughter, it will be important to make an appointment with Dr. Barrow in followup to ensure that he has regular catheter changes and to discuss whether an indwelling Hewitt catheter is a long-term plan.   6.  Deep venous thrombosis prophylaxis:  SCDs.         HARPREET LINO DO             D: 11/18/2019   T: 11/18/2019   MT: BLAZE      Name:     THI SOARES   MRN:       [id number]        Account:      [id number]   :      1931        Admitted:     2019                   Document: [id number]       cc: Zelalem Hernandez MD

## 2019-11-20 ENCOUNTER — APPOINTMENT (OUTPATIENT)
Dept: PHYSICAL THERAPY | Facility: CLINIC | Age: 84
DRG: 699 | End: 2019-11-20
Attending: INTERNAL MEDICINE
Payer: COMMERCIAL

## 2019-11-20 ENCOUNTER — APPOINTMENT (OUTPATIENT)
Dept: OCCUPATIONAL THERAPY | Facility: CLINIC | Age: 84
DRG: 699 | End: 2019-11-20
Attending: INTERNAL MEDICINE
Payer: COMMERCIAL

## 2019-11-20 LAB
ANION GAP SERPL CALCULATED.3IONS-SCNC: 5 MMOL/L (ref 3–14)
BUN SERPL-MCNC: 43 MG/DL (ref 7–30)
CALCIUM SERPL-MCNC: 8.3 MG/DL (ref 8.5–10.1)
CHLORIDE SERPL-SCNC: 111 MMOL/L (ref 94–109)
CO2 SERPL-SCNC: 22 MMOL/L (ref 20–32)
CREAT SERPL-MCNC: 2.56 MG/DL (ref 0.66–1.25)
GFR SERPL CREATININE-BSD FRML MDRD: 21 ML/MIN/{1.73_M2}
GLUCOSE SERPL-MCNC: 87 MG/DL (ref 70–99)
POTASSIUM SERPL-SCNC: 4.1 MMOL/L (ref 3.4–5.3)
SODIUM SERPL-SCNC: 138 MMOL/L (ref 133–144)

## 2019-11-20 PROCEDURE — 99232 SBSQ HOSP IP/OBS MODERATE 35: CPT | Performed by: INTERNAL MEDICINE

## 2019-11-20 PROCEDURE — 80048 BASIC METABOLIC PNL TOTAL CA: CPT | Performed by: INTERNAL MEDICINE

## 2019-11-20 PROCEDURE — 36415 COLL VENOUS BLD VENIPUNCTURE: CPT | Performed by: INTERNAL MEDICINE

## 2019-11-20 PROCEDURE — 12000000 ZZH R&B MED SURG/OB

## 2019-11-20 PROCEDURE — 25000132 ZZH RX MED GY IP 250 OP 250 PS 637: Performed by: INTERNAL MEDICINE

## 2019-11-20 PROCEDURE — 97110 THERAPEUTIC EXERCISES: CPT | Mod: GP

## 2019-11-20 PROCEDURE — 97166 OT EVAL MOD COMPLEX 45 MIN: CPT | Mod: GO | Performed by: OCCUPATIONAL THERAPIST

## 2019-11-20 PROCEDURE — 97530 THERAPEUTIC ACTIVITIES: CPT | Mod: GO | Performed by: OCCUPATIONAL THERAPIST

## 2019-11-20 PROCEDURE — 97116 GAIT TRAINING THERAPY: CPT | Mod: GP

## 2019-11-20 PROCEDURE — 25000128 H RX IP 250 OP 636: Performed by: INTERNAL MEDICINE

## 2019-11-20 PROCEDURE — 97535 SELF CARE MNGMENT TRAINING: CPT | Mod: GO | Performed by: OCCUPATIONAL THERAPIST

## 2019-11-20 RX ORDER — HYDRALAZINE HYDROCHLORIDE 20 MG/ML
10 INJECTION INTRAMUSCULAR; INTRAVENOUS EVERY 4 HOURS PRN
Status: DISCONTINUED | OUTPATIENT
Start: 2019-11-20 | End: 2019-11-21 | Stop reason: HOSPADM

## 2019-11-20 RX ADMIN — DONEPEZIL HYDROCHLORIDE 10 MG: 10 TABLET ORAL at 19:33

## 2019-11-20 RX ADMIN — CEFTRIAXONE SODIUM 1 G: 1 INJECTION, POWDER, FOR SOLUTION INTRAMUSCULAR; INTRAVENOUS at 19:32

## 2019-11-20 RX ADMIN — AMLODIPINE BESYLATE 10 MG: 10 TABLET ORAL at 07:56

## 2019-11-20 RX ADMIN — SERTRALINE HYDROCHLORIDE 100 MG: 100 TABLET ORAL at 07:57

## 2019-11-20 RX ADMIN — SERTRALINE HYDROCHLORIDE 100 MG: 100 TABLET ORAL at 21:46

## 2019-11-20 ASSESSMENT — ACTIVITIES OF DAILY LIVING (ADL)
ADLS_ACUITY_SCORE: 16

## 2019-11-20 NOTE — PROGRESS NOTES
Per chart review OT recommending home care OT.  Spoke with patient about home care and he asked that I call his spouse or daughter.  Called pt's daughter Maryellen to discuss discharge planning.  Per Maryellen, her mom is discharging home from TCU today and will not be able to assist pt at home as she is still recovering from her fx ribs.  Maryellen requesting pt go to TCU and requesting a referral be sent to [facility].  Discussed this with the  and she will send referral to [facility]. Informed Maryellen that SW will call her with updates on placement.

## 2019-11-20 NOTE — PLAN OF CARE
Pt A&Ox2, disoriented to time and situation.  VSS on Room Air.  Denies pain at this time.  CMS intact.  Up w/SBA.  R DAYAN WHITE.  Marcelina patent w/adequate output.  PT/OT following.  Nursing to continue to monitor.

## 2019-11-20 NOTE — PROGRESS NOTES
11/20/19 0800   Quick Adds   Type of Visit Initial Occupational Therapy Evaluation   Living Environment   Lives With spouse   Living Arrangements house   Transportation Anticipated family or friend will provide   Living Environment Comment Pt lives in house full flight to bedroom. Info per PT note   Self-Care   Usual Activity Tolerance good   Current Activity Tolerance moderate   Equipment Currently Used at Home none   Functional Level   Ambulation 1-->assistive equipment   Transferring 0-->independent   Toileting 0-->independent   Bathing 0-->independent   Dressing 0-->independent   Communication 0-->understands/communicates without difficulty   Cognition 1 - attention or memory deficits   Fall history within last six months no   Prior Functional Level Comment Pt is IND at baseline per dtr. Wife assists with emptying joyce as well as meds. Wife currently admitted to the hospital    General Information   Onset of Illness/Injury or Date of Surgery - Date 11/19/19   Referring Physician Anders Lopez   Patient/Family Goals Statement home   Additional Occupational Profile Info/Pertinent History of Current Problem 87 y/o male with PMHx remarkable for chronic indwelling joyce since summer 2019, CKD IV, dementia who presented to the hospital 2/2 confusion and urinary catheter issue   Precautions/Limitations fall precautions   Weight-Bearing Status - LUE full weight-bearing   Weight-Bearing Status - RUE full weight-bearing   Weight-Bearing Status - LLE full weight-bearing   Weight-Bearing Status - RLE full weight-bearing   Heart Disease Risk Factors Medical history;Gender;Age   General Observations Pt seated in chair upon arrival; pt agreeable to OT session   General Info Comments Activity order: up with assist prn   Cognitive Status Examination   Orientation orientation to person, place and time   Level of Consciousness alert;confused   Follows Commands (Cognition) WFL   Memory impaired   Cognitive Comment Pt follows  directions with occassional need for repetition; told therapist about sister who had , then referred to her later as alive and able to assist him   Posture   Posture forward head position;protracted shoulders   Range of Motion (ROM)   ROM Comment BUE WFL   Strength   Strength Comments BUE generally 5/5   Coordination   Coordination Comments Mild tremor finger-to-nose and opposing thumb to fingertips   Transfer Skill: Sit to Stand   Level of [place]: Sit/Stand stand-by assist   Physical Assist/Nonphysical Assist: Sit/Stand supervision   Transfer Skill: Sit to Stand full weight-bearing   Transfer Skill: Toilet Transfer   Level of [place]: Toilet stand-by assist   Physical Assist/Nonphysical Assist: Toilet verbal cues;supervision   Weight-Bearing Restrictions: Toilet full weight-bearing   Assistive Device grab bars   Tub/Shower Transfer   Tub/Shower Transfer Comments Pt has walk-in shower    Balance   Balance Comments Good seated; good static standing with mild balance challenges; unsteady ambulating in room, no LOB   Lower Body Dressing   Level of [place]: Dress Lower Body stand-by assist   Physical Assist/Nonphysical Assist: Dress Lower Body supervision   Eating/Self Feeding   Level of [place]: Eating independent   Instrumental Activities of Daily Living (IADL)   IADL Comments Wife assists with meds and joyce; pt reports he still drives   Activities of Daily Living Analysis   Impairments Contributing to Impaired Activities of Daily Living balance impaired;cognition impaired;coordination impaired   General Therapy Interventions   Planned Therapy Interventions ADL retraining;transfer training   Clinical Impression   Criteria for Skilled Therapeutic Interventions Met yes, treatment indicated   OT Diagnosis impaired ADLs   Influenced by the following impairments decreaed coordination, decreased balance, impaired cognition   Assessment of Occupational Performance 3-5 Performance Deficits  "  Identified Performance Deficits dressing, toileting, shower, driving, finances, meds   Clinical Decision Making (Complexity) Moderate complexity   Therapy Frequency Daily   Predicted Duration of Therapy Intervention (days/wks) 3 days   Anticipated Equipment Needs at Discharge shower chair   Anticipated Discharge Disposition Home with Home Therapy   Risks and Benefits of Treatment have been explained. Yes   Patient, Family & other staff in agreement with plan of care Yes   [facility] AM-PAC  \"6 Clicks\" Daily Activity Inpatient Short Form   1. Putting on and taking off regular lower body clothing? 3 - A Little   2. Bathing (including washing, rinsing, drying)? 3 - A Little   3. Toileting, which includes using toilet, bedpan or urinal? 3 - A Little   4. Putting on and taking off regular upper body clothing? 4 - None   5. Taking care of personal grooming such as brushing teeth? 3 - A Little   6. Eating meals? 4 - None   Daily Activity Raw Score (Score out of 24.Lower scores equate to lower levels of function) 20   Total Evaluation Time   Total Evaluation Time (Minutes) 8     "

## 2019-11-20 NOTE — CONSULTS
Care Transition Initial Assessment - SW     Met with:chart review  Active Problems:    UTI (urinary tract infection)       DATA  Lives With: spouse   Living Arrangements: house  Quality of Family Relationships: supportive, involved  Description of Support System: Supportive, Involved  Who is your support system?: Wife  Support Assessment: Adequate family and caregiver support.   Identified issues/concerns regarding health management: SW following for discharge needs  Quality of Family Relationships: supportive, involved  Transportation Anticipated: family or friend will provide    ASSESSMENT  Cognitive Status: Alert and oriented X2   Concerns to be addressed: Patient is a 88 year old male who was admitted to the hospital for UTI. Prior to hospitalization patient was living at home where his spouse assists with meds, driving, joyce. Patient's spouse is currently hospitalized but will likely be discharged today. Per Therapy, patient is near baseline and they are recommending patient discharge home with prior assistance from spouse and family along with home care services (PT/OT). CC RN is following for home care needs.    ADDENDUM  I: SW updated that patient is not able to discharge home as patient does not have enough support at home and spouse just was hospitalized and not able to provide necessary assistance. Daughter, Maryellen, would like a referral sent to [facility].    ADDENDUM  I: [facility] has a bed for tomorrow 11/21. SW will update patient's daughter, Maryellen. Maryellen would like to transport patient around 12:00/12:30.     PLAN  Financial costs for the patient includes   Patient given options and choices for discharge   Patient/family is agreeable to the plan?  YES  Transportation/person available to transport on day of discharge  is  and have they been notified/set up  Patient Goals and Preferences: Home care  Patient anticipates discharging to: Home with assist    JENNIFER Altamirano, [facility]  [phone]  M  [facility]

## 2019-11-20 NOTE — PLAN OF CARE
Alert x2, disoriented to place/time, pleasant. [place]. VSS, no c/o pain. Regular diet, good appetite. Up SBA. Ambulated escobar x1. Hewitt patent, adequate output. R PIV SL, abx. PT/OT consulted. Daughter updated in plan. Plan pending UA sensitivity. Continue to monitor.

## 2019-11-20 NOTE — PLAN OF CARE
Discharge Planner OT   Patient plan for discharge: home  Current status: evaluation completed, treatment initiated.  Patient lives with his wife in a house with bed upstairs.  She is currently a patient with broken ribs due to a fall, but should be discharging to home today.  Wife typically assists with meds and joyce.  Pt able to complete dressing, hygienes, toilet transfer, ambulation in room all with SBA.  Pt unsteady on feet, but no LOB. Oriented x4; pt told therapist about his sister who  recently, but a little later referred to her as someone who could assist him at home, pt embarrassed when he realized he'd forgotten her death and stated he'd been depressed lately.  Pt reports he still drives.  Pt generally following directions, but needing repetition at times for simple tasks. Mild incoordination with FM tasks.  Barriers to return to prior living situation: impaired cognition, decreased balance, decreased coordination  Recommendations for discharge: home with resumed assist for meds, driving, joyce, and Home OT safety evaluation; recommend installing grab bar and shower seat in shower.  Rationale for recommendations: if patient's family can provide assist as at baseline, pt should be able to discharge to home with HH OT.  Home OT recommended for evaluation of home set-up, and as leaving the home would present taxing effort at this time.       Entered by: KVNG ROSAS 2019 9:02 AM

## 2019-11-20 NOTE — PLAN OF CARE
Discharge Planner PT   Patient plan for discharge: TCU per discussion with daughter.   Current status: pt was received supine in bed. Pt is at mod I with supine>sit. Pt sat at EOB x 2 min at independent. STS at SBA. Pt ambulated 300 ft without assistive devices, CGA progressing to SBA. Pt went up 1 flight of stairs using 1 rail support and min assist x 1 for safety, reciprocal steps. Pt is edu on using non-reciprocal stepping pattern and to slow down the pace of the task to increase safety. Pt noted to be unsteady when managing stairs with impaired safety awareness. Pt descended 1 flight of stairs using B rail support and min assist x 1 for safety. Pt participated in standing LE there ex with PT's guidance. Pt noted to be [place] with impaired cognition and impaired safety awareness.    Barriers to return to prior living situation: Family unable to assist at home, Impaired balance and safety, fall risk, Stairs to access bedroom.  Recommendations for discharge: TCU  Rationale for recommendations: Pt noted with impaired balance, cognition and safety awareness limiting independence with functional mobility. Pt will benefit from continued skilled PT to achieve PLOF.        Entered by: Kathryn Stokes 11/20/2019 3:28 PM

## 2019-11-21 VITALS
RESPIRATION RATE: 16 BRPM | HEIGHT: 73 IN | WEIGHT: 168 LBS | BODY MASS INDEX: 22.26 KG/M2 | SYSTOLIC BLOOD PRESSURE: 159 MMHG | TEMPERATURE: 96.3 F | HEART RATE: 57 BPM | OXYGEN SATURATION: 96 % | DIASTOLIC BLOOD PRESSURE: 72 MMHG

## 2019-11-21 LAB
BACTERIA SPEC CULT: ABNORMAL
BACTERIA SPEC CULT: ABNORMAL
ERYTHROCYTE [DISTWIDTH] IN BLOOD BY AUTOMATED COUNT: 14 % (ref 10–15)
HCT VFR BLD AUTO: 40.1 % (ref 40–53)
HGB BLD-MCNC: 13.4 G/DL (ref 13.3–17.7)
Lab: ABNORMAL
MCH RBC QN AUTO: 29.3 PG (ref 26.5–33)
MCHC RBC AUTO-ENTMCNC: 33.4 G/DL (ref 31.5–36.5)
MCV RBC AUTO: 88 FL (ref 78–100)
PLATELET # BLD AUTO: 146 10E9/L (ref 150–450)
RBC # BLD AUTO: 4.58 10E12/L (ref 4.4–5.9)
SPECIMEN SOURCE: ABNORMAL
WBC # BLD AUTO: 7.7 10E9/L (ref 4–11)

## 2019-11-21 PROCEDURE — 85027 COMPLETE CBC AUTOMATED: CPT | Performed by: INTERNAL MEDICINE

## 2019-11-21 PROCEDURE — 99238 HOSP IP/OBS DSCHRG MGMT 30/<: CPT | Performed by: INTERNAL MEDICINE

## 2019-11-21 PROCEDURE — 25000132 ZZH RX MED GY IP 250 OP 250 PS 637: Performed by: INTERNAL MEDICINE

## 2019-11-21 PROCEDURE — 36415 COLL VENOUS BLD VENIPUNCTURE: CPT | Performed by: INTERNAL MEDICINE

## 2019-11-21 RX ORDER — CIPROFLOXACIN 250 MG/1
250 TABLET, FILM COATED ORAL EVERY 12 HOURS SCHEDULED
Status: DISCONTINUED | OUTPATIENT
Start: 2019-11-21 | End: 2019-11-21 | Stop reason: HOSPADM

## 2019-11-21 RX ORDER — CIPROFLOXACIN 250 MG/1
250 TABLET, FILM COATED ORAL EVERY 12 HOURS
Qty: 14 TABLET | Refills: 0 | DISCHARGE
Start: 2019-11-21 | End: 2020-01-17

## 2019-11-21 RX ADMIN — AMLODIPINE BESYLATE 10 MG: 10 TABLET ORAL at 07:45

## 2019-11-21 RX ADMIN — CIPROFLOXACIN HYDROCHLORIDE 250 MG: 250 TABLET, FILM COATED ORAL at 10:55

## 2019-11-21 RX ADMIN — SERTRALINE HYDROCHLORIDE 100 MG: 100 TABLET ORAL at 07:45

## 2019-11-21 ASSESSMENT — ACTIVITIES OF DAILY LIVING (ADL)
ADLS_ACUITY_SCORE: 16

## 2019-11-21 NOTE — PROGRESS NOTES
[facility]    Hospitalist Progress Note    Date of Service (when I saw the patient): 11/20/2019    Assessment & Plan   Mr. Soares is an 87 y/o male with PMHx remarkable for chronic indwelling joyce since summer 2019, CKD IV, dementia who presented to the hospital 2/2 confusion and urinary catheter issue    UTI, catheter related  H/o kidney stones and urinary retention, catheter since 7/2019. Daughters who assist with cares note that he has been much more confused than usual. Also noted strong smelling urine odor. No f/c, some dizziness. In ED afebrile, sl hypertensive. WBC at 8.0. UA grossly positive for infection  - urine culture growing serratia marcescens  - continues on ceftriaxone 1 g q24 hours IV for now pending sensitivities  - hopeful sensitivities to change to oral abx 11/20  - therapies rec: TCU  - discharge once sensitivities available    Alzheimer dementia  Encephalopathy, likely 2/2 infection   [donepezil 10 mg daily, sertraline 100 mg BID]  Encephalopathy as above. Improving with treatment for UTI  - continue donepezil, sertraline    HTN  [amlodipine 10 mg daily]  Continue amlodipine with hold parameters    CKD IV  Baseline creatinine appears variable, from ~2.3 to 3.4. creatinine at the time of admission at 2.64  - avoid nephrotoxinx  - creatinine stable 11/20    Chronic indwelling joyce  Needs to follow up with Dr. Barrow with urology re: regular catheter changes    FEN (fluids, electrolytes and nutrition): discontinue IV fluids, regular diet  Discussed with nursing.  DVT Prophylaxis: Pneumatic Compression Devices  Code Status: DNR/DNI    Disposition: Expected discharge possibly 11/21 if sensitivities back on urine cx    Anders Lopez MD  [phone] (P)  Text Page    Interval History   Overnight events reviewed. Denies cp/sob at this time. Mildly confused    -Data reviewed today: I reviewed all new labs and imaging results over the last 24 hours. I personally reviewed no  images or EKG's today.    Physical Exam   Temp: 95.4  F (35.2  C) Temp src: Oral BP: (!) 144/54 Pulse: 57 Heart Rate: 57 Resp: 16 SpO2: 98 % O2 Device: None (Room air)    Vitals:    11/18/19 1728 11/18/19 2200   Weight: 79.2 kg (174 lb 9.6 oz) 76.2 kg (168 lb)     Vital Signs with Ranges  Temp:  [95.4  F (35.2  C)-97.6  F (36.4  C)] 95.4  F (35.2  C)  Pulse:  [52-57] 57  Heart Rate:  [52-57] 57  Resp:  [16-18] 16  BP: (131-159)/(54-76) 144/54  SpO2:  [95 %-98 %] 98 %  I/O last 3 completed shifts:  In: 600 [P.O.:600]  Out: 1350 [Urine:1350]    Constitutional: Alert, oriented, perseverating on issues  Respiratory: Lungs clear to auscultation bilaterally, no wheezes, no crackles  Cardiovascular: Regular rate and rhythm, no murmurs  GI: Soft, non-tender, non-disteneded, good bowel sounds  Skin/Integumen: No erythema, cyanosis or edema  Other:      Medications       amLODIPine  10 mg Oral Daily     cefTRIAXone  1 g Intravenous Q24H     donepezil  10 mg Oral Daily     sertraline  100 mg Oral BID     sodium chloride (PF)  3 mL Intracatheter Q8H       Data   Recent Labs   Lab 11/20/19  0719 11/19/19  0733 11/18/19  1924   WBC  --   --  8.0   HGB  --   --  13.9   MCV  --   --  88   PLT  --   --  153    141 139   POTASSIUM 4.1 4.4 4.6   CHLORIDE 111* 111* 110*   CO2 22 24 23   BUN 43* 40* 43*   CR 2.56* 2.67* 2.64*   ANIONGAP 5 6 6   VIKY 8.3* 8.5 8.3*   GLC 87 78 80       No results found for this or any previous visit (from the past 24 hour(s)).

## 2019-11-21 NOTE — PLAN OF CARE
Patient discharged at 12:25 PM to Discharged to short-term care facility  IV was discontinued by JACKIE Pain at time of discharge was 0/10. Belongings returned to patient.  Discharge instructions and medications reviewed with patient.  Patient verbalized understanding and all questions were answered. Prescriptions given to patient.  At time of discharge, patient condition was stable and left the unit via WC escorted by JACKIE and transported to [facility] with daughter.

## 2019-11-21 NOTE — DISCHARGE INSTRUCTIONS
MD wants joyce exchanged in TCU every 2 weeks. Once discharged from TCU, joyce exchanged every 4 weeks. Joyce last exchanged on 11/18, next changed 12/2.

## 2019-11-21 NOTE — DISCHARGE SUMMARY
[facility]    Discharge Summary  Hospitalist    Date of Admission:  11/18/2019  Date of Discharge:  11/21/2019  Discharging Provider: Anders Lopez MD  Date of Service (when I saw the patient): 11/21/19    Discharge Diagnoses   Catheter associated UTI  Chronic indwelling Joyce  Alzheimer dementia  Encephalopathy likely secondary to infection  Hypertension  Chronic kidney disease, stage IV      History of Present Illness   Mr. Soares is an 89 y/o male with PMHx remarkable for chronic indwelling joyce since summer 2019, CKD IV, dementia who presented to the hospital 2/2 confusion and urinary catheter issue    Hospital Course   Bj Soares was admitted on 11/18/2019.  The following problems were addressed during his hospitalization:    Catheter associated UTI  Chronic indwelling Joyce  Mr. Soares presented to the hospital with confusion and urinary catheter issues.  He has a history of nephrolithiasis and urinary retention has had a catheter since July 2019.  It is unclear per history as if and when he had his catheter changed.  Prior to presentation he had some strong smelling urine order.  There were no fevers or chills.  Urinalysis was checked and was grossly positive for infection.  There is no evidence of sepsis.  White count was normal.  Ultimately is urine culture grew Serratia marcescens.  He was continued on ceftriaxone during his hospital stay which Serratia was sensitive.  At the time of discharge he will be on a 7-day course of ciprofloxacin 250 mg twice daily.  Of note the bacteria was sensitive to Cipro as well.  Will be discharged to TCU.  He should have his catheter changed every 2 weeks without the TCU.  He should also follow-up with Dr. Barrow with urology within 4 weeks of hospital discharge.    Alzheimer dementia  Encephalopathy likely secondary to infection  He has moderate underlying dementia.  He is generally amiable with no behavior disturbance during his  hospital stay.  He should be continued on his prior to admission medications.    Hypertension  Blood pressures are reasonable during his hospital stay.  Continue on his prior to admission amlodipine 10 mg daily.    Chronic kidney disease, stage IV  His baseline creatinine is in the 2.3 to 3.4 range.  Creatinine during his hospital stay was in his low normal range.    Anders Lopez M.D.  Hospitalist  Pager [phone]    Significant Results and Procedures   Hewitt catheter change    Pending Results   None    Code Status   DNR / DNI       Primary Care Physician   Zelalem Hernandez    Physical Exam   Temp: 96.3  F (35.7  C) Temp src: Oral BP: (!) 159/72 Pulse: 57 Heart Rate: 59 Resp: 16 SpO2: 96 % O2 Device: None (Room air)    Vitals:    11/18/19 1728 11/18/19 2200   Weight: 79.2 kg (174 lb 9.6 oz) 76.2 kg (168 lb)     Vital Signs with Ranges  Temp:  [95.4  F (35.2  C)-97.1  F (36.2  C)] 96.3  F (35.7  C)  Pulse:  [57] 57  Heart Rate:  [57-59] 59  Resp:  [16] 16  BP: (131-174)/(54-81) 159/72  SpO2:  [95 %-98 %] 96 %  I/O last 3 completed shifts:  In: 240 [P.O.:240]  Out: 1500 [Urine:1500]    Constitutional: Alert, pleasantly confused  Respiratory: Lungs clear to auscultation bilaterally, no wheezes, no crackles  Cardiovascular: Regular rate and rhythm, no murmurs  GI: Soft, non-tender, non-disteneded, good bowel sounds  Skin/Integumen: No erythema, cyanosis or edema  Other:      Discharge Disposition   Discharged to short-term care facility  Condition at discharge: Stable    Consultations This Hospital Stay   SOCIAL WORK IP CONSULT  PHYSICAL THERAPY ADULT IP CONSULT  OCCUPATIONAL THERAPY ADULT IP CONSULT  PHYSICAL THERAPY ADULT IP CONSULT  OCCUPATIONAL THERAPY ADULT IP CONSULT    Time Spent on this Encounter   Anders MASSEY MD, personally saw the patient today and spent less than or equal to 30 minutes discharging this patient.    Discharge Orders      General info for SNF    Length of Stay Estimate: Short  Term Care: Estimated # of Days <30  Condition at Discharge: Improving  Level of care:skilled   Rehabilitation Potential: Good  Admission H&P remains valid and up-to-date: Yes  Recent Chemotherapy: N/A  Use Nursing Home Standing Orders: Yes     Mantoux instructions    Give two-step Mantoux (PPD) Per Facility Policy Yes     Reason for your hospital stay    You were hospitalized with an infection in your urinary tract, likely because of the catheter     Hewitt catheter    To straight gravity drainage. Change catheter every 2 weeks and PRN for leaking or decreased uring output with signs of bladder distention. DO NOT change catheter without a specific MD order IF diagnosis of benign prostatic hypertrophy (BPH), neurogenic bladder, or other urological conditions     Follow Up and recommended labs and tests    Follow up with longterm physician.  The following labs/tests are recommended: CBC, BMP.  Follow up with specialist Dr. Barrow with urology in 4 weeks.  No follow up labs or test are needed.     Activity - Up with nursing assistance     DNR/DNI     Physical Therapy Adult Consult    Evaluate and treat as clinically indicated.    Reason:  Weakness/ deconditioning with recent UTI     Occupational Therapy Adult Consult    Evaluate and treat as clinically indicated.    Reason:  Weakness/ deconditioning with recent UTI     Advance Diet as Tolerated    Follow this diet upon discharge: Orders Placed This Encounter      Room Service      Room Service      Combination Diet Regular Diet Adult     Discharge Medications   Current Discharge Medication List      START taking these medications    Details   ciprofloxacin (CIPRO) 250 MG tablet Take 1 tablet (250 mg) by mouth every 12 hours  Qty: 14 tablet, Refills: 0    Associated Diagnoses: Urinary tract infection associated with indwelling urethral catheter, initial encounter (H)         CONTINUE these medications which have NOT CHANGED    Details   amLODIPine (NORVASC) 10 MG  tablet Take 10 mg by mouth daily      diphenhydrAMINE-acetaminophen (TYLENOL PM)  MG tablet Take 1 tablet by mouth nightly as needed for sleep      donepezil (ARICEPT) 10 MG tablet Take 10 mg by mouth daily       loperamide (IMODIUM A-D) 2 MG tablet Take 2 mg by mouth daily as needed for diarrhea      sertraline (ZOLOFT) 100 MG tablet Take 100 mg by mouth 2 times daily       vitamin D3 (CHOLECALCIFEROL) 2000 units (50 mcg) tablet Take 1 tablet by mouth daily            Allergies   No Known Allergies  Data   Most Recent 3 CBC's:  Recent Labs   Lab Test 11/21/19  0708 11/18/19 1924   WBC 7.7 8.0   HGB 13.4 13.9   MCV 88 88   * 153      Most Recent 3 BMP's:  Recent Labs   Lab Test 11/20/19  0719 11/19/19  0733 11/18/19 1924    141 139   POTASSIUM 4.1 4.4 4.6   CHLORIDE 111* 111* 110*   CO2 22 24 23   BUN 43* 40* 43*   CR 2.56* 2.67* 2.64*   ANIONGAP 5 6 6   VIKY 8.3* 8.5 8.3*   GLC 87 78 80     Most Recent 2 LFT's:No lab results found.  Most Recent INR's and Anticoagulation Dosing History:  Anticoagulation Dose History     There is no flowsheet data to display.        Most Recent 3 Troponin's:No lab results found.  Most Recent Cholesterol Panel:No lab results found.  Most Recent 6 Bacteria Isolates From Any Culture (See EPIC Reports for Culture Details):  Recent Labs   Lab Test 11/18/19  1731   CULT 50,000 to 100,000 colonies/mL  Serratia marcescens  *  50,000 to 100,000 colonies/mL  Strain 2  Serratia marcescens  *     Most Recent TSH, T4 and A1c Labs:No lab results found.  Results for orders placed or performed during the hospital encounter of 07/09/19   XR Surgery MARCUS L/T 5 Min Fluoro w Stills    Narrative    SURGERY C-ARM FLUOROSCOPY LESS THAN FIVE MINUTES WITH STILLS    7/9/2019 11:55 AM     COMPARISON: None.    HISTORY: Bilateral ureteral stones. Fluoroscopy time 1 min 57 seconds.  Three images.    NUMBER OF IMAGES ACQUIRED: 3    VIEWS: 2    FLUOROSCOPY TIME: 2 minute(s)      Impression     IMPRESSION: Three fluoroscopic images overlying the right and left  pelvis acquired for guidance of guidewire placement for the clinical  service.    HARPREET ROSARIO MD

## 2019-11-21 NOTE — PLAN OF CARE
OT: pt has discharged to TCU, GOALS NOT MET, see discharge summary    Occupational Therapy Discharge Summary    Reason for therapy discharge:    Discharged to transitional care facility.    Progress towards therapy goal(s). See goals on Care Plan [facility] electronic [facility] record for goal details.  Goals not met.  Barriers to achieving goals:   discharge from facility.    Therapy recommendation(s):    Continued therapy is recommended.  Rationale/Recommendations:  maximize safety and independence with ADLs.

## 2019-11-21 NOTE — PROGRESS NOTES
MD Notification    Notified Person: MD    Notified Person Name: Anshu    Notification Date/Time: 11/20/19 2051    Notification Interaction: telephone page    Purpose of Notification: /81, no PRN orders available    Orders Received: PRN hydralazine ordered for SBP>180    Comments:

## 2019-11-21 NOTE — PLAN OF CARE
Pt mostly A/Ox4, can be disoriented to time/situation briefly and forgetful.  BP elevated-IV hydralazine available but parameters not met.  Other VSS on RA.  Denies pain.  Up with SBA, tolerating reg diet. Discharge to TCU tomorrow if urine sensitivies come back, nursing continue to monitor.

## 2019-11-21 NOTE — PLAN OF CARE
AOx2, disoriented to time and situation. VSS on RA. [place]. Denies pain. Hewitt patent, adequate output. Regular diet, good appetite. Chair for meals. R PIV SL, abx. Up SBA, ambulates in room, escobar x1. Plan discharge to [facility] hopefully tomorrow. Continue to monitor.

## 2019-11-21 NOTE — PLAN OF CARE
A+O x 2-3 with confusion. Came in for a UTI and HTN. Has generalized weakness, CKD, Dementia, inattention, and slight aphasia. Chronic joyce present. SBA. Bilat foot numbness and blanchable redness on his coccyx. Calm and cooperative. Discharge possibly to [facility] today.

## 2019-12-18 ENCOUNTER — RECORDS - HEALTHEAST (OUTPATIENT)
Dept: LAB | Facility: CLINIC | Age: 84
End: 2019-12-18

## 2019-12-18 LAB
ALBUMIN UR-MCNC: ABNORMAL MG/DL
APPEARANCE UR: ABNORMAL
BACTERIA #/AREA URNS HPF: ABNORMAL HPF
BILIRUB UR QL STRIP: NEGATIVE
COLOR UR AUTO: ABNORMAL
GLUCOSE UR STRIP-MCNC: NEGATIVE MG/DL
HGB UR QL STRIP: ABNORMAL
KETONES UR STRIP-MCNC: NEGATIVE MG/DL
LEUKOCYTE ESTERASE UR QL STRIP: ABNORMAL
NITRATE UR QL: POSITIVE
PH UR STRIP: 6 [PH] (ref 4.5–8)
RBC #/AREA URNS AUTO: >100 HPF
SP GR UR STRIP: 1.03 (ref 1–1.03)
SQUAMOUS #/AREA URNS AUTO: ABNORMAL LPF
UROBILINOGEN UR STRIP-ACNC: ABNORMAL
WBC #/AREA URNS AUTO: >100 HPF
WBC CLUMPS #/AREA URNS HPF: PRESENT /[HPF]

## 2019-12-21 LAB — BACTERIA SPEC CULT: ABNORMAL

## 2020-01-16 PROCEDURE — 51702 INSERT TEMP BLADDER CATH: CPT

## 2020-01-16 PROCEDURE — 99283 EMERGENCY DEPT VISIT LOW MDM: CPT

## 2020-01-17 ENCOUNTER — HOSPITAL ENCOUNTER (EMERGENCY)
Facility: CLINIC | Age: 85
Discharge: HOME | End: 2020-01-17
Attending: EMERGENCY MEDICINE | Admitting: EMERGENCY MEDICINE
Payer: COMMERCIAL

## 2020-01-17 VITALS
DIASTOLIC BLOOD PRESSURE: 66 MMHG | HEART RATE: 61 BPM | WEIGHT: 168 LBS | TEMPERATURE: 97.9 F | OXYGEN SATURATION: 100 % | RESPIRATION RATE: 14 BRPM | SYSTOLIC BLOOD PRESSURE: 151 MMHG | BODY MASS INDEX: 22.16 KG/M2

## 2020-01-17 DIAGNOSIS — R82.81 PYURIA: ICD-10-CM

## 2020-01-17 DIAGNOSIS — T83.511A URINARY TRACT INFECTION ASSOCIATED WITH INDWELLING URETHRAL CATHETER, INITIAL ENCOUNTER (H): ICD-10-CM

## 2020-01-17 DIAGNOSIS — T83.011A MALFUNCTION OF FOLEY CATHETER, INITIAL ENCOUNTER (H): ICD-10-CM

## 2020-01-17 DIAGNOSIS — N39.0 URINARY TRACT INFECTION ASSOCIATED WITH INDWELLING URETHRAL CATHETER, INITIAL ENCOUNTER (H): ICD-10-CM

## 2020-01-17 LAB
ALBUMIN UR-MCNC: 100 MG/DL
APPEARANCE UR: ABNORMAL
BACTERIA #/AREA URNS HPF: ABNORMAL /HPF
BILIRUB UR QL STRIP: NEGATIVE
COLOR UR AUTO: YELLOW
GLUCOSE UR STRIP-MCNC: NEGATIVE MG/DL
HGB UR QL STRIP: ABNORMAL
KETONES UR STRIP-MCNC: NEGATIVE MG/DL
LEUKOCYTE ESTERASE UR QL STRIP: ABNORMAL
MUCOUS THREADS #/AREA URNS LPF: PRESENT /LPF
NITRATE UR QL: NEGATIVE
PH UR STRIP: 6 PH (ref 5–7)
RBC #/AREA URNS AUTO: 78 /HPF (ref 0–2)
SOURCE: ABNORMAL
SP GR UR STRIP: 1.01 (ref 1–1.03)
UROBILINOGEN UR STRIP-MCNC: NORMAL MG/DL (ref 0–2)
WBC #/AREA URNS AUTO: 118 /HPF (ref 0–5)
WBC CLUMPS #/AREA URNS HPF: PRESENT /HPF

## 2020-01-17 PROCEDURE — 87088 URINE BACTERIA CULTURE: CPT | Performed by: EMERGENCY MEDICINE

## 2020-01-17 PROCEDURE — 81001 URINALYSIS AUTO W/SCOPE: CPT | Performed by: EMERGENCY MEDICINE

## 2020-01-17 PROCEDURE — 87086 URINE CULTURE/COLONY COUNT: CPT | Performed by: EMERGENCY MEDICINE

## 2020-01-17 PROCEDURE — 25000125 ZZHC RX 250

## 2020-01-17 PROCEDURE — 87186 SC STD MICRODIL/AGAR DIL: CPT | Performed by: EMERGENCY MEDICINE

## 2020-01-17 RX ORDER — CIPROFLOXACIN 250 MG/1
250 TABLET, FILM COATED ORAL DAILY
Qty: 7 TABLET | Refills: 0 | Status: SHIPPED | OUTPATIENT
Start: 2020-01-17 | End: 2020-07-17

## 2020-01-17 RX ORDER — LIDOCAINE HYDROCHLORIDE 20 MG/ML
10 JELLY TOPICAL ONCE
Status: DISCONTINUED | OUTPATIENT
Start: 2020-01-17 | End: 2020-01-17 | Stop reason: CLARIF

## 2020-01-17 RX ADMIN — LIDOCAINE HYDROCHLORIDE: 10 INJECTION, SOLUTION EPIDURAL; INFILTRATION; INTRACAUDAL; PERINEURAL at 01:40

## 2020-01-17 ASSESSMENT — ENCOUNTER SYMPTOMS: DIFFICULTY URINATING: 1

## 2020-01-17 NOTE — DISCHARGE INSTRUCTIONS
*You may resume diet and activities as tolerated.  *Take medications as prescribed.  Ciprofloxacin as directed.   *Follow up with your doctor in the next 2-3 days for a recheck.,  *Return if you develop fever, back pain, vomiting, joyce catheter stops draining, altered mental status, faint or feel like you will faint or become worse in any way.    Discharge Instructions  Urinary Tract Infection  You or your child have been diagnosed with a urinary tract infection, or UTI. The urinary tract includes the kidneys (which make urine/pee), ureters (the tubes that carry urine/pee from the kidneys to the bladder), the bladder (which stores urine/pee), and urethra (the tube that carries urine/pee out of the bladder). Urinary tract infections occur when bacteria travel up the urethra into the bladder (bladder infection) and, in some cases, from there into the kidneys (kidney infection).  Generally, every Emergency Department visit should have a follow-up clinic visit with either a primary or a specialty clinic/provider. Please follow-up as instructed by your emergency provider today.  Return to the Emergency Department if:  You or your child have severe back pain.  You or your child are vomiting (throwing up) so that you cannot take your medicine.  You or your child have a new fever (had not previously had a fever) over 101 F.  You or your child have confusion or are very weak, or feel very ill.  Your child seems much more ill, will not wake up, will not respond right, or is crying for a long time and will not calm down.  You or your child are showing signs of dehydration. These signs may include decreased urination (pee), dry mouth/gums/tongue, or decreased activity.    Follow-up with your provider:   Children under 24 months need to be seen by their regular provider within one week after a diagnosis of a UTI. It may be necessary to do some more tests to look at the child s kidney or bladder.  You should begin to feel better  within 24 - 48 hours of starting your antibiotic; follow-up with your regular clinic/doctor/provider if this is not the case.    Treatment:   You will be treated with an antibiotic to kill the bacteria. We have to make an educated guess, based on what we know about common bacteria and antibiotics, as to which antibiotic will work for your infection. We will be correct most times but there will be some cases where the antibiotic chosen is not correct (see urine cultures below).  Take a pain medication such as acetaminophen (Tylenol ) or ibuprofen (Advil , Motrin , Nuprin ).  Phenazopyridine (Pyridium , Uristat ) is a prescription medication that numbs the bladder to reduce the burning pain of some UTIs.  The same medication is available in a non-prescription version (Azo-Standard , Urodol ). This medication will change the color of the urine and tears (usually blue or orange). If you wear contacts, do not wear them while taking this medication as they may be stained by the medication.    Urine Cultures:  If indicated, a urine culture may have been performed today. This test generally takes 24-48 hours to complete so the results are not known at this time. The results can confirm that an infection is present but also determine which antibiotic is effective for the specific bacteria that is causing the infection. If your urine culture shows that the antibiotic you were given today will not work to treat your infection, we will attempt to contact you to make arrangements to change the antibiotic. If the culture confirms that the antibiotic is effective for your infection, you will not be contacted. We often recommend follow-up with your regular physician/provider on the culture results regardless of this process.    Antibiotic Warning:   If you have been placed on antibiotics - watch for signs of allergic reaction.  These include rash, lip swelling, difficulty breathing, wheezing, and dizziness.  If you develop any of  "these symptoms, stop the antibiotic immediately and go to an emergency room or urgent care for evaluation.    Probiotics: If you have been given an antibiotic, you may want to also take a probiotic pill or eat yogurt with live cultures. Probiotics have \"good bacteria\" to help your intestines stay healthy. Studies have shown that probiotics help prevent diarrhea and other intestine problems (including C. diff infection) when you take antibiotics. You can buy these without a prescription in the pharmacy section of the store.   If you were given a prescription for medicine here today, be sure to read all of the information (including the package insert) that comes with your prescription.  This will include important information about the medicine, its side effects, and any warnings that you need to know about.  The pharmacist who fills the prescription can provide more information and answer questions you may have about the medicine.  If you have questions or concerns that the pharmacist cannot address, please call or return to the Emergency Department.   Remember that you can always come back to the Emergency Department if you are not able to see your regular provider in the amount of time listed above, if you get any new symptoms, or if there is anything that worries you.    "

## 2020-01-17 NOTE — ED AVS SNAPSHOT
Emergency Department  [address] [place] [id number]  Phone:  [phone]  Fax:  [phone]                                    Bj Soares   MRN: [id number]    Department:   Emergency Department   Date of Visit:  1/16/2020           After Visit Summary Signature Page    I have received my discharge instructions, and my questions have been answered. I have discussed any challenges I see with this plan with the nurse or doctor.    ..........................................................................................................................................  Patient/Patient Representative Signature      ..........................................................................................................................................  Patient Representative Print Name and Relationship to Patient    ..................................................               ................................................  Date                                   Time    ..........................................................................................................................................  Reviewed by Signature/Title    ...................................................              ..............................................  Date                                               Time          22EPIC Rev 08/18

## 2020-01-17 NOTE — ED PROVIDER NOTES
History     Chief Complaint:  Catheter Problem     HPI   Bj Soares is a 88 year old male with a chronic joyce catheter who presents accompanied by his daughter for evaluation of a catheter problem. The patient is currently living in a memory care facility and he had his joyce catheter changed today around 1200 by home health staff. His daughter reports that he had more pain with his catheter placement today than he usually does, and since having his catheter changed he has not had urine draining into the collection bag. Due to this the patient's daughter brought him into the ED for evaluation with primary concern that his catheter will require replacement.     Allergies:  NKDA      Medications:    Amlodipine  Cipro  Tylenol PM   Donepezil  Imodium A-D   Zoloft   Vitamin D3      Past Medical History:    Alzheimer disease   Chronic kidney disease   Dementia   Depression  First degree AV block   Hyperlipidemia   Hyperparathyroidism  Hypertension    Nephrolithiasis   Sinus bradycardia  Umbilical hernia  Urine retention     Past Surgical History:    Esophagoscopy  Cataract iol, rt/lt   ESWL, cystoscopy, insert stent ureter(s), combined, bilateral   TURP     Family History:    History reviewed. No pertinent family history.     Social History:  Tobacco use:    Former pipe smoker, quit 2008  Alcohol use:    Negative   Drug use:    Negative   Marital status:       Accompanied to ED by:  Daughter       Review of Systems   Genitourinary: Positive for difficulty urinating.   All other systems reviewed and are negative.    Physical Exam   First Vitals:  BP: (!) 170/70  Pulse: 61  Heart Rate: 52  Temp: 97.9  F (36.6  C)  Resp: 18  Weight: 76.2 kg (168 lb)  SpO2: 97 %    Physical Exam  General: Well-nourished, appears to be resting comfortably when I enter the room  Eyes: PERRL, conjunctivae pink no scleral icterus or conjunctival injection  ENT:  Moist mucus membranes, posterior oropharynx clear without  erythema or exudates  Respiratory:  Lungs clear to auscultation bilaterally, no crackles/rubs/wheezes.  Good air movement  CV: Normal rate and rhythm, no murmurs/rubs/gallops  GI:  Abdomen soft and non-distended.  Normoactive BS.  No tenderness, guarding or rebound  : Normal external exam, Joyce catheter draining some scant bloody discharge.  No urine in the bag.  Skin: Warm, dry.  No rashes or petechiae  Musculoskeletal: No peripheral edema or calf tenderness  Neuro: Alert and oriented to person and states his daughter is his girlfriend  Psychiatric: Normal affect      Emergency Department Course     Laboratory:  UA with Microscopic: Large blood, Protein albumin 100, Large leukocyte esterase,  high, RBC 78 high, WBC clumps present, Few bacteria, Mucous present, o/w Negative   Urine Culture: Pending     Emergency Department Course:  Nursing notes and vitals reviewed.  0104: I performed an exam of the patient as documented above.     0202: I updated and reassessed the patient.     Findings and plan explained to the Patient and daughter. Patient discharged home with instructions regarding supportive care, medications, and reasons to return. The importance of close follow-up was reviewed. The patient was prescribed Cipro.     Impression & Plan      Medical Decision Making:  Bj Soares is a 88 year old male who presents for evaluation of abdominal pain and decreased urinary output.  I considered a broad differential including diverticulitis, aneurysm, urinary retention, ureterolithiasis, UTI, pyelonephritis, neurologic causes (MS, cauda equina,etc), colitis, etc.  The history and exam are consistent with acute urinary retention due to malfunctioning or misplaced Joyce.  This is confirmed after joyce catheter placement.  A urinalysis was obtained and did show some pyuria.  It is difficult to interpret in light of his chronic indwelling Joyce catheter.  Urine culture is pending.  I discussed with his  daughter and she requested that we go ahead and start on antibiotics until the urine culture is returned.  I reviewed the previous urine culture and we will discharge on ciprofloxacin as a seem to have helped and it was sensitive on the previous culture. Patient is stable for discharge home.      Diagnosis:    ICD-10-CM   1. Malfunction of Hewitt catheter, initial encounter (H) T83.011A   2. Pyuria R82.81   3. Urinary tract infection associated with indwelling urethral catheter, initial encounter (H) T83.511A    N39.0     Disposition:  Discharged to home with Cipro.     Discharge Medications:  Ciprofloxacin 250 mg - 7 tablets, Take 1 tablet (250 mg) by mouth daily - Oral      Alan MASSEY, am serving as a scribe at 1:04 AM on 1/17/2020 to document services personally performed by Dr. Aldridge, based on my observations and the provider's statements to me.     EMERGENCY DEPARTMENT       Delia Aldridge MD  01/17/20 3500

## 2020-01-17 NOTE — ED TRIAGE NOTES
Pt reports he has had a chronic joyce for the last 7 months, reports it was changed today at 1200 by home health staff.  Pt reports he has not had urinary output since this joyce was placed and reports increased pain with joyce placement.

## 2020-01-19 LAB
BACTERIA SPEC CULT: ABNORMAL
SPECIMEN SOURCE: ABNORMAL

## 2020-03-11 ENCOUNTER — HEALTH MAINTENANCE LETTER (OUTPATIENT)
Age: 85
End: 2020-03-11

## 2020-07-17 ENCOUNTER — HOSPITAL ENCOUNTER (OUTPATIENT)
Facility: CLINIC | Age: 85
Setting detail: OBSERVATION
Discharge: HOME | End: 2020-07-18
Attending: EMERGENCY MEDICINE
Payer: COMMERCIAL

## 2020-07-17 ENCOUNTER — APPOINTMENT (OUTPATIENT)
Dept: CT IMAGING | Facility: CLINIC | Age: 85
End: 2020-07-17
Attending: EMERGENCY MEDICINE
Payer: COMMERCIAL

## 2020-07-17 DIAGNOSIS — N28.9 RENAL INSUFFICIENCY: ICD-10-CM

## 2020-07-17 DIAGNOSIS — R26.81 UNSTEADY GAIT: ICD-10-CM

## 2020-07-17 DIAGNOSIS — R53.1 GENERALIZED WEAKNESS: ICD-10-CM

## 2020-07-17 DIAGNOSIS — N39.0 URINARY TRACT INFECTION WITHOUT HEMATURIA, SITE UNSPECIFIED: Primary | ICD-10-CM

## 2020-07-17 DIAGNOSIS — N39.0 URINARY TRACT INFECTION ASSOCIATED WITH INDWELLING URETHRAL CATHETER, INITIAL ENCOUNTER (H): ICD-10-CM

## 2020-07-17 DIAGNOSIS — I44.7 LBBB (LEFT BUNDLE BRANCH BLOCK): ICD-10-CM

## 2020-07-17 DIAGNOSIS — T83.511A URINARY TRACT INFECTION ASSOCIATED WITH INDWELLING URETHRAL CATHETER, INITIAL ENCOUNTER (H): ICD-10-CM

## 2020-07-17 DIAGNOSIS — T67.5XXA HEAT EXHAUSTION, INITIAL ENCOUNTER: ICD-10-CM

## 2020-07-17 DIAGNOSIS — E86.0 DEHYDRATION: ICD-10-CM

## 2020-07-17 LAB
ALBUMIN UR-MCNC: 300 MG/DL
AMORPH CRY #/AREA URNS HPF: ABNORMAL /HPF
ANION GAP SERPL CALCULATED.3IONS-SCNC: 8 MMOL/L (ref 3–14)
APPEARANCE UR: ABNORMAL
BASOPHILS # BLD AUTO: 0 10E9/L (ref 0–0.2)
BASOPHILS NFR BLD AUTO: 0.1 %
BILIRUB UR QL STRIP: NEGATIVE
BUN SERPL-MCNC: 37 MG/DL (ref 7–30)
CALCIUM SERPL-MCNC: 8.4 MG/DL (ref 8.5–10.1)
CHLORIDE SERPL-SCNC: 111 MMOL/L (ref 94–109)
CO2 SERPL-SCNC: 21 MMOL/L (ref 20–32)
COLOR UR AUTO: YELLOW
CREAT SERPL-MCNC: 2.9 MG/DL (ref 0.66–1.25)
DIFFERENTIAL METHOD BLD: ABNORMAL
EOSINOPHIL # BLD AUTO: 0.2 10E9/L (ref 0–0.7)
EOSINOPHIL NFR BLD AUTO: 1.7 %
ERYTHROCYTE [DISTWIDTH] IN BLOOD BY AUTOMATED COUNT: 14 % (ref 10–15)
GFR SERPL CREATININE-BSD FRML MDRD: 18 ML/MIN/{1.73_M2}
GLUCOSE SERPL-MCNC: 109 MG/DL (ref 70–99)
GLUCOSE UR STRIP-MCNC: NEGATIVE MG/DL
HCT VFR BLD AUTO: 42 % (ref 40–53)
HGB BLD-MCNC: 14.1 G/DL (ref 13.3–17.7)
HGB UR QL STRIP: ABNORMAL
IMM GRANULOCYTES # BLD: 0.2 10E9/L (ref 0–0.4)
IMM GRANULOCYTES NFR BLD: 1.7 %
INTERPRETATION ECG - MUSE: NORMAL
KETONES UR STRIP-MCNC: NEGATIVE MG/DL
LEUKOCYTE ESTERASE UR QL STRIP: ABNORMAL
LYMPHOCYTES # BLD AUTO: 0.7 10E9/L (ref 0.8–5.3)
LYMPHOCYTES NFR BLD AUTO: 7.7 %
MCH RBC QN AUTO: 29.9 PG (ref 26.5–33)
MCHC RBC AUTO-ENTMCNC: 33.6 G/DL (ref 31.5–36.5)
MCV RBC AUTO: 89 FL (ref 78–100)
MONOCYTES # BLD AUTO: 0.7 10E9/L (ref 0–1.3)
MONOCYTES NFR BLD AUTO: 7.5 %
MUCOUS THREADS #/AREA URNS LPF: PRESENT /LPF
NEUTROPHILS # BLD AUTO: 7.2 10E9/L (ref 1.6–8.3)
NEUTROPHILS NFR BLD AUTO: 81.3 %
NITRATE UR QL: POSITIVE
NRBC # BLD AUTO: 0 10*3/UL
NRBC BLD AUTO-RTO: 0 /100
PH UR STRIP: 6 PH (ref 5–7)
PLATELET # BLD AUTO: 144 10E9/L (ref 150–450)
POTASSIUM SERPL-SCNC: 4.1 MMOL/L (ref 3.4–5.3)
RBC # BLD AUTO: 4.72 10E12/L (ref 4.4–5.9)
RBC #/AREA URNS AUTO: 6 /HPF (ref 0–2)
SODIUM SERPL-SCNC: 140 MMOL/L (ref 133–144)
SOURCE: ABNORMAL
SP GR UR STRIP: 1.01 (ref 1–1.03)
TROPONIN I SERPL-MCNC: <0.015 UG/L (ref 0–0.04)
UROBILINOGEN UR STRIP-MCNC: NORMAL MG/DL (ref 0–2)
WBC # BLD AUTO: 8.9 10E9/L (ref 4–11)
WBC #/AREA URNS AUTO: 56 /HPF (ref 0–5)

## 2020-07-17 PROCEDURE — U0003 INFECTIOUS AGENT DETECTION BY NUCLEIC ACID (DNA OR RNA); SEVERE ACUTE RESPIRATORY SYNDROME CORONAVIRUS 2 (SARS-COV-2) (CORONAVIRUS DISEASE [COVID-19]), AMPLIFIED PROBE TECHNIQUE, MAKING USE OF HIGH THROUGHPUT TECHNOLOGIES AS DESCRIBED BY CMS-2020-01-R: HCPCS | Performed by: EMERGENCY MEDICINE

## 2020-07-17 PROCEDURE — 87088 URINE BACTERIA CULTURE: CPT | Performed by: EMERGENCY MEDICINE

## 2020-07-17 PROCEDURE — 87086 URINE CULTURE/COLONY COUNT: CPT | Performed by: EMERGENCY MEDICINE

## 2020-07-17 PROCEDURE — C9803 HOPD COVID-19 SPEC COLLECT: HCPCS

## 2020-07-17 PROCEDURE — 25800030 ZZH RX IP 258 OP 636: Performed by: EMERGENCY MEDICINE

## 2020-07-17 PROCEDURE — 84484 ASSAY OF TROPONIN QUANT: CPT | Performed by: EMERGENCY MEDICINE

## 2020-07-17 PROCEDURE — G0378 HOSPITAL OBSERVATION PER HR: HCPCS

## 2020-07-17 PROCEDURE — 80048 BASIC METABOLIC PNL TOTAL CA: CPT | Performed by: EMERGENCY MEDICINE

## 2020-07-17 PROCEDURE — 25800030 ZZH RX IP 258 OP 636: Performed by: HOSPITALIST

## 2020-07-17 PROCEDURE — 85025 COMPLETE CBC W/AUTO DIFF WBC: CPT | Performed by: EMERGENCY MEDICINE

## 2020-07-17 PROCEDURE — 96361 HYDRATE IV INFUSION ADD-ON: CPT

## 2020-07-17 PROCEDURE — 99220 ZZC INITIAL OBSERVATION CARE,LEVL III: CPT | Performed by: HOSPITALIST

## 2020-07-17 PROCEDURE — 25000128 H RX IP 250 OP 636: Performed by: EMERGENCY MEDICINE

## 2020-07-17 PROCEDURE — 81001 URINALYSIS AUTO W/SCOPE: CPT | Performed by: EMERGENCY MEDICINE

## 2020-07-17 PROCEDURE — 96365 THER/PROPH/DIAG IV INF INIT: CPT

## 2020-07-17 PROCEDURE — 99207 ZZC CDG-MDM COMPONENT: MEETS MODERATE - UP CODED: CPT | Performed by: HOSPITALIST

## 2020-07-17 PROCEDURE — 99285 EMERGENCY DEPT VISIT HI MDM: CPT | Mod: 25

## 2020-07-17 PROCEDURE — 93005 ELECTROCARDIOGRAM TRACING: CPT

## 2020-07-17 PROCEDURE — 87186 SC STD MICRODIL/AGAR DIL: CPT | Performed by: EMERGENCY MEDICINE

## 2020-07-17 PROCEDURE — 70450 CT HEAD/BRAIN W/O DYE: CPT

## 2020-07-17 RX ORDER — CEFTRIAXONE 1 G/1
1 INJECTION, POWDER, FOR SOLUTION INTRAMUSCULAR; INTRAVENOUS EVERY 24 HOURS
Status: DISCONTINUED | OUTPATIENT
Start: 2020-07-18 | End: 2020-07-18

## 2020-07-17 RX ORDER — ONDANSETRON 2 MG/ML
4 INJECTION INTRAMUSCULAR; INTRAVENOUS EVERY 6 HOURS PRN
Status: DISCONTINUED | OUTPATIENT
Start: 2020-07-17 | End: 2020-07-18 | Stop reason: HOSPADM

## 2020-07-17 RX ORDER — ONDANSETRON 4 MG/1
4 TABLET, ORALLY DISINTEGRATING ORAL EVERY 6 HOURS PRN
Status: DISCONTINUED | OUTPATIENT
Start: 2020-07-17 | End: 2020-07-18 | Stop reason: HOSPADM

## 2020-07-17 RX ORDER — LANOLIN ALCOHOL/MO/W.PET/CERES
1 CREAM (GRAM) TOPICAL
COMMUNITY
End: 2020-07-17

## 2020-07-17 RX ORDER — ACETAMINOPHEN 650 MG/1
650 SUPPOSITORY RECTAL EVERY 4 HOURS PRN
Status: DISCONTINUED | OUTPATIENT
Start: 2020-07-17 | End: 2020-07-18 | Stop reason: HOSPADM

## 2020-07-17 RX ORDER — NALOXONE HYDROCHLORIDE 0.4 MG/ML
.1-.4 INJECTION, SOLUTION INTRAMUSCULAR; INTRAVENOUS; SUBCUTANEOUS
Status: DISCONTINUED | OUTPATIENT
Start: 2020-07-17 | End: 2020-07-18 | Stop reason: HOSPADM

## 2020-07-17 RX ORDER — CEFTRIAXONE 1 G/1
1 INJECTION, POWDER, FOR SOLUTION INTRAMUSCULAR; INTRAVENOUS ONCE
Status: COMPLETED | OUTPATIENT
Start: 2020-07-17 | End: 2020-07-17

## 2020-07-17 RX ORDER — SODIUM CHLORIDE 9 MG/ML
INJECTION, SOLUTION INTRAVENOUS CONTINUOUS
Status: DISCONTINUED | OUTPATIENT
Start: 2020-07-17 | End: 2020-07-18

## 2020-07-17 RX ORDER — POLYETHYLENE GLYCOL 3350 17 G/17G
17 POWDER, FOR SOLUTION ORAL DAILY PRN
Status: DISCONTINUED | OUTPATIENT
Start: 2020-07-17 | End: 2020-07-18 | Stop reason: HOSPADM

## 2020-07-17 RX ORDER — ACETAMINOPHEN 325 MG/1
650 TABLET ORAL EVERY 4 HOURS PRN
Status: DISCONTINUED | OUTPATIENT
Start: 2020-07-17 | End: 2020-07-18 | Stop reason: HOSPADM

## 2020-07-17 RX ORDER — ASPIRIN 81 MG/1
81 TABLET ORAL DAILY
Status: DISCONTINUED | OUTPATIENT
Start: 2020-07-18 | End: 2020-07-18 | Stop reason: HOSPADM

## 2020-07-17 RX ADMIN — SODIUM CHLORIDE 500 ML: 9 INJECTION, SOLUTION INTRAVENOUS at 20:10

## 2020-07-17 RX ADMIN — SODIUM CHLORIDE, PRESERVATIVE FREE: 5 INJECTION INTRAVENOUS at 23:47

## 2020-07-17 RX ADMIN — CEFTRIAXONE SODIUM 1 G: 1 INJECTION, POWDER, FOR SOLUTION INTRAMUSCULAR; INTRAVENOUS at 21:09

## 2020-07-17 NOTE — LETTER
July 20, 2020        Bj Soares  [address] [facility] [address]    This letter provides a written record that you were tested for COVID-19 on 7/17/20.       Your result was negative. This means that we didn t find the virus that causes COVID-19 in your sample. A test may show negative when you do actually have the virus. This can happen when the virus is in the early stages of infection, before you feel illness symptoms.    If you have symptoms   Stay home and away from others (self-isolate) until you meet ALL of the guidelines below:    You ve had no fever--and no medicine that reduces fever--for 3 full days (72 hours). And      Your other symptoms have gotten better. For example, your cough or breathing has improved. And     At least 10 days have passed since your symptoms started.    During this time:    Stay home. Don t go to work, school or anywhere else.     Stay in your own room, including for meals. Use your own bathroom if you can.    Stay away from others in your home. No hugging, kissing or shaking hands. No visitors.    Clean  high touch  surfaces often (doorknobs, counters, handles, etc.). Use a household cleaning spray or wipes. You can find a full list on the EPA website at www.epa.gov/pesticide-registration/list-n-disinfectants-use-against-sars-cov-2.    Cover your mouth and nose with a mask, tissue or washcloth to avoid spreading germs.    Wash your hands and face often with soap and water.    Going back to work  Check with your employer for any guidelines to follow for going back to work.    Employers: This document serves as formal notice that your employee tested negative for COVID-19, as of the testing date shown above.

## 2020-07-18 ENCOUNTER — APPOINTMENT (OUTPATIENT)
Dept: PHYSICAL THERAPY | Facility: CLINIC | Age: 85
End: 2020-07-18
Attending: HOSPITALIST
Payer: COMMERCIAL

## 2020-07-18 VITALS
TEMPERATURE: 98.5 F | DIASTOLIC BLOOD PRESSURE: 80 MMHG | HEART RATE: 56 BPM | WEIGHT: 190 LBS | OXYGEN SATURATION: 96 % | SYSTOLIC BLOOD PRESSURE: 160 MMHG | BODY MASS INDEX: 25.07 KG/M2 | RESPIRATION RATE: 16 BRPM

## 2020-07-18 LAB
ANION GAP SERPL CALCULATED.3IONS-SCNC: 5 MMOL/L (ref 3–14)
BUN SERPL-MCNC: 34 MG/DL (ref 7–30)
CALCIUM SERPL-MCNC: 8.5 MG/DL (ref 8.5–10.1)
CHLORIDE SERPL-SCNC: 113 MMOL/L (ref 94–109)
CO2 SERPL-SCNC: 24 MMOL/L (ref 20–32)
CREAT SERPL-MCNC: 2.68 MG/DL (ref 0.66–1.25)
GFR SERPL CREATININE-BSD FRML MDRD: 20 ML/MIN/{1.73_M2}
GLUCOSE BLDC GLUCOMTR-MCNC: 130 MG/DL (ref 70–99)
GLUCOSE SERPL-MCNC: 93 MG/DL (ref 70–99)
POTASSIUM SERPL-SCNC: 4.6 MMOL/L (ref 3.4–5.3)
SARS-COV-2 RNA SPEC QL NAA+PROBE: NOT DETECTED
SODIUM SERPL-SCNC: 142 MMOL/L (ref 133–144)
SPECIMEN SOURCE: NORMAL

## 2020-07-18 PROCEDURE — 25000132 ZZH RX MED GY IP 250 OP 250 PS 637: Performed by: INTERNAL MEDICINE

## 2020-07-18 PROCEDURE — 00000146 ZZHCL STATISTIC GLUCOSE BY METER IP

## 2020-07-18 PROCEDURE — 96361 HYDRATE IV INFUSION ADD-ON: CPT

## 2020-07-18 PROCEDURE — 36415 COLL VENOUS BLD VENIPUNCTURE: CPT | Performed by: HOSPITALIST

## 2020-07-18 PROCEDURE — 99207 ZZC CDG-CODE CATEGORY CHANGED: CPT | Performed by: INTERNAL MEDICINE

## 2020-07-18 PROCEDURE — 25000132 ZZH RX MED GY IP 250 OP 250 PS 637: Performed by: HOSPITALIST

## 2020-07-18 PROCEDURE — 97161 PT EVAL LOW COMPLEX 20 MIN: CPT | Mod: GP

## 2020-07-18 PROCEDURE — 99217 ZZC OBSERVATION CARE DISCHARGE: CPT | Performed by: INTERNAL MEDICINE

## 2020-07-18 PROCEDURE — G0378 HOSPITAL OBSERVATION PER HR: HCPCS

## 2020-07-18 PROCEDURE — 80048 BASIC METABOLIC PNL TOTAL CA: CPT | Performed by: HOSPITALIST

## 2020-07-18 RX ORDER — CIPROFLOXACIN 500 MG/1
500 TABLET, FILM COATED ORAL
Status: DISCONTINUED | OUTPATIENT
Start: 2020-07-18 | End: 2020-07-18 | Stop reason: HOSPADM

## 2020-07-18 RX ORDER — CIPROFLOXACIN 500 MG/1
500 TABLET, FILM COATED ORAL DAILY
Qty: 6 TABLET | Refills: 0 | Status: SHIPPED | OUTPATIENT
Start: 2020-07-18

## 2020-07-18 RX ORDER — HYDRALAZINE HYDROCHLORIDE 25 MG/1
25 TABLET, FILM COATED ORAL EVERY 4 HOURS PRN
Status: DISCONTINUED | OUTPATIENT
Start: 2020-07-18 | End: 2020-07-18 | Stop reason: HOSPADM

## 2020-07-18 RX ORDER — DONEPEZIL HYDROCHLORIDE 5 MG/1
5 TABLET, FILM COATED ORAL EVERY MORNING
Status: DISCONTINUED | OUTPATIENT
Start: 2020-07-18 | End: 2020-07-18 | Stop reason: HOSPADM

## 2020-07-18 RX ADMIN — SERTRALINE HYDROCHLORIDE 100 MG: 50 TABLET ORAL at 12:11

## 2020-07-18 RX ADMIN — ASPIRIN 81 MG: 81 TABLET ORAL at 08:33

## 2020-07-18 RX ADMIN — DONEPEZIL HYDROCHLORIDE 5 MG: 5 TABLET, FILM COATED ORAL at 12:11

## 2020-07-18 RX ADMIN — CIPROFLOXACIN HYDROCHLORIDE 500 MG: 500 TABLET, FILM COATED ORAL at 11:07

## 2020-07-18 NOTE — H&P
[facility]    History and Physical - Hospitalist Service       Date of Admission:  7/17/2020    Assessment & Plan   Yogesh Palafox, is a 88 year old male with medical history significant for Alzheimer's dementia, hypertension, depression CKD stage IV was brought to the ER for evaluation of generalized weakness. He likely has heat exhaustion and UTI and he is being registered to observation on 7/17/2020 for further management.    UTI  Patient with history of Serratia marcescens/UTI and encephalopathy in the past.  Sensitive to Rocephin.  His generalized weakness could be due to dehydration, heat exhaustion and UTI.  -Continue Rocephin IV  -Follow urine culture    Heat exhaustion  He was quite weak but feels better now.  His temperature is normal.  He appears slightly dehydrated, also reflected in his BUN/creatinine.  Daughter feels his mentation is at his baseline.  -Continue IV hydration    Generalized weakness  -Secondary to above, CT head negative.  Treating underlying UTI and dehydration/heat education as above.  Will have PT evaluation for safe discharge planning tomorrow.    LBBB  Patient reported an episode when he felt he had trouble breathing but completely subsided.  Denies chest pain or chest pressure.  EKG shows LBBB which is new compared to 2019.  Troponin is negative.  -Discussed with daughter, they would not want any intervention for this.  She agreed to check troponin only in case of chest pain or other definitive cardiac symptoms so that he could be managed medically.  -I will put him on baby aspirin.    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  Appears dehydrated.  He did not have anything to drink all day when he was staying outside in the ports.  -Gentle IV hydration and follow BMP.  -Bladder scan PRN to make sure he is not retaining  urine    Depression  Resume PTA Zoloft when verified     Diet: Regular  DVT Prophylaxis: Pneumatic Compression Devices  Hewitt Catheter: in place, indication:    Code Status: DNR/DNI, discussed with patient's daughter present in the room and confirmed.         Disposition Plan   Expected discharge: Tomorrow, recommended to prior living arrangement once Adequately hydrated, safe discharge plan made.  Entered: Piotr Mcclendon MD 07/17/2020, 8:34 PM     The patient's care was discussed with the Patient and Patient's Family.    Piotr Mcclendon MD  [facility]    ______________________________________________________________________    Chief Complaint   Generalized weakness    History is obtained from the patient, chart review, discussion with ER physician.    History of Present Illness   Bj Soares is a 88 year old male with medical history significant for Alzheimer's dementia, hypertension, depression CKD stage IV was brought to the ER for evaluation of generalized weakness.    Patient was sitting outside in the porch since 11 AM today.  He only had a popsicle all day.  Later he was not able to get up from the chair and so his wife called their daughters.  The 2 daughters came in and they could barely get him up from the chair and so called 911.  Patient was able to walk to the [facility] with assistance when EMS arrived.  Patient felt very tired and nauseous, and vomited as well.  He thinks he had food poisoning with a popsicle.  He does report that he felt he could not breathe once but  denies chest pain or diaphoresis now feels back to his baseline.    No fever, urinary symptoms abdominal pain, chest pain, diarrhea.    In ER, patient was evaluated by Dr. Ayon.  Creatinine was noted to be 2.9 which is baseline seems to be around 2.6.  ABC showed mild thrombocytopenia of 144.  Blood sugar was 109.  CT head was negative for acute intracranial process.  Twelve-lead EKG showed new left  bundle branch block compared to a year ago.  His UA came back abnormal suggestive of UTI, culture was sent.  Patient received a liter of normal saline IV and a gram of Rocephin IV was given.  He feels much better and feels that he could go home.  An observation admission was requested.    Review of Systems    The 10 point Review of Systems is negative other than noted in the HPI or here.      Past Medical History    I have reviewed this patient's medical history and updated it with pertinent information if needed.   Past Medical History:   Diagnosis Date     Alzheimer disease (H)      CKD (chronic kidney disease)      Dementia (H)      Depression      First degree AV block      Hyperlipidemia      Hyperparathyroidism (H)      Hypertension      Nephrolithiasis      Sinus bradycardia      Umbilical hernia      Urine retention        Past Surgical History   I have reviewed this patient's surgical history and updated it with pertinent information if needed.  Past Surgical History:   Procedure Laterality Date     AS ESOPHAGOSCOPY, DIAGNOSTIC       CATARACT IOL, RT/LT       EXTRACORPOREAL SHOCK WAVE LITHOTRIPSY, CYSTOSCOPY, INSERT STENT URETER(S), COMBINED Bilateral 7/9/2019    Procedure: CYSTOSCOPY BILATERAL URETEROSCOPY. HOLMIUM LASER, STONE REMOVAL;  Surgeon: Shamar Barrow MD;  Location:  OR     TURP         Social History   I have reviewed this patient's social history and updated it with pertinent information if needed.      Family History     Family history reviewed, not contributory to his presentation at this time    Prior to Admission Medications   Prior to Admission Medications   Prescriptions Last Dose Informant Patient Reported? Taking?   amLODIPine (NORVASC) 10 MG tablet  Daughter Yes No   Sig: Take 10 mg by mouth daily   ciprofloxacin (CIPRO) 250 MG tablet   No No   Sig: Take 1 tablet (250 mg) by mouth daily   diphenhydrAMINE-acetaminophen (TYLENOL PM)  MG tablet  Daughter Yes No   Sig: Take 1  tablet by mouth nightly as needed for sleep   donepezil (ARICEPT) 10 MG tablet  Daughter Yes No   Sig: Take 10 mg by mouth daily    loperamide (IMODIUM A-D) 2 MG tablet  Daughter Yes No   Sig: Take 2 mg by mouth daily as needed for diarrhea   sertraline (ZOLOFT) 100 MG tablet  Daughter Yes No   Sig: Take 100 mg by mouth 2 times daily    vitamin D3 (CHOLECALCIFEROL) 2000 units (50 mcg) tablet  Daughter Yes No   Sig: Take 1 tablet by mouth daily       Facility-Administered Medications: None     Allergies   No Known Allergies    Physical Exam   Vital Signs: Temp: 98  F (36.7  C) Temp src: Oral BP: (!) 193/80 Pulse: 62   Resp: 16 SpO2: 97 %      Weight: 184 lbs 0 oz    General: Alert awake, very pleasant and cooperative, appears comfortable.  HEENT: PERRLA EOMI. Mucosa dry  Lungs: Bilateral equal air entry. Clear to auscultation, normal work of breathing.   CVS: S1S2 regular, no tachycardia or murmur.   Abdomen: Soft, NT, ND. BS heard.  Reducible, nontender umbilical hernia noted.  MSK: No edema or deformities.  Neuro: Alert awake, pleasant, calm and cooperative.  CN 2-12 normal. Strength symmetrical.  Skin: No rash.       Data   Data reviewed today: I reviewed all medications, new labs and imaging results over the last 24 hours. I personally reviewed the EKG tracing showing Left bundle branch block, new compared to 1 year ago.    Recent Labs   Lab 07/17/20  1930   WBC 8.9   HGB 14.1   MCV 89   *      POTASSIUM 4.1   CHLORIDE 111*   CO2 21   BUN 37*   CR 2.90*   ANIONGAP 8   VIKY 8.4*   *   TROPI <0.015     Recent Results (from the past 24 hour(s))   Head CT w/o contrast    Narrative    CT SCAN OF THE HEAD WITHOUT CONTRAST   7/17/2020 7:51 PM     HISTORY: Unsteady gait, weakness since 11:00 am.    TECHNIQUE: Axial images of the head and coronal reformations without  IV contrast material. Radiation dose for this scan was reduced using  automated exposure control, adjustment of the mA and/or kV  according  to patient size, or iterative reconstruction technique.    COMPARISON: None.    FINDINGS: Mild cerebral atrophy is present. Minimal nonspecific white  matter changes are present without mass effect. There is no evidence  for intracranial hemorrhage, mass effect, acute infarct, or skull  fracture.      Impression    IMPRESSION: Chronic changes. No evidence for intracranial hemorrhage  or any acute process.    GAMAL CASTILLO MD

## 2020-07-18 NOTE — CONSULTS
SW  Acknowledging SW consult for discharge planning.  Patient has been discharged by hospitalist.  Therapy is recommending out patient PT.  Bedside nurse is calling family to discuss discharge planning.   Nurse will update writer if family has any concerns with bringing patient home.  Formal assessment will not be completed.

## 2020-07-18 NOTE — PROGRESS NOTES
07/18/20 0915   Quick Adds   Quick Adds Certification   Type of Visit Initial PT Evaluation   Living Environment   Lives With spouse   Living Arrangements house   Home Accessibility stairs to enter home;stairs within home   Number of Stairs, Main Entrance   (4 from back, 8 from front)   Stair Railings, Main Entrance railing on right side (ascending)   Number of Stairs, Within Home, Primary   (1 optional flight to upstairs)   Transportation Anticipated   (pt normally drives)   Self-Care   Usual Activity Tolerance moderate   Regular Exercise Yes   Activity/Exercise Type walking   Equipment Currently Used at Home cane, straight;walker, rolling   Activity/Exercise/Self-Care Comment Walks [employer] 2x/wk (used to do it daily), golfs.   Functional Level Prior   Ambulation 1-->assistive equipment   Transferring 0-->independent   Toileting 0-->independent   Bathing 1-->assistive equipment  (stands, has grab bars)   Communication 0-->understands/communicates without difficulty   Swallowing 0-->swallows foods/liquids without difficulty   Cognition 0 - no cognition issues reported   Fall history within last six months no  (But couldn't get off the chair, Ax2. )   Prior Functional Level Comment Cane use the last 1-2 months given pain, per chart doesn't use it consistently but does not it's safer with it.    General Information   Onset of Illness/Injury or Date of Surgery - Date 07/18/20   Referring Physician Piotr Mcclendon MD    Patient/Family Goals Statement To get home and stay independent, to not let this affect his golf game.    Pertinent History of Current Problem (include personal factors and/or comorbidities that impact the POC) Admit after sitting outside all day with limited intake with pt unable to get up from his chair - Ax2 then 911 called. 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   Precautions/Limitations fall precautions   Weight-Bearing Status - LUE full  weight-bearing  (all)   Cognitive Status Examination   Level of Consciousness alert   Follows Commands and Answers Questions 100% of the time   Personal Safety and Judgment impaired;at risk behaviors demonstrated   Cognitive Comment Modoc   Range of Motion (ROM)   ROM Comment WFL   Strength   Strength Comments WFL   Bed Mobility   Bed Mobility Comments Roz Supine to/from sitting, B rolling.    Transfer Skills   Transfer Comments Roz sit-stands no AD WBOS increased time. Pivots supervision - mildly unsteady but slow and guarded.    Gait   Gait Comments Supervision self-selected, SBA during DGI tasks. STAIRS: 1 flight first iwth 2 rails with supervision then 1 rail per home set-up wtih SBA given instability (self-corrected).    Balance   Balance Comments Falls risk: DGI 13/24 (< 20 indicates falls risk).    General Therapy Interventions   Planned Therapy Interventions gait training;neuromuscular re-education;balance training;risk factor education;home program guidelines;progressive activity/exercise   Clinical Impression   Criteria for Skilled Therapeutic Intervention yes, treatment indicated   PT Diagnosis Impaired mobility   Influenced by the following impairments Impaired balance, mobility   Functional limitations due to impairments Impaired mobility   Clinical Presentation Stable/Uncomplicated   Clinical Decision Making (Complexity) Low complexity   Therapy Frequency Daily   Predicted Duration of Therapy Intervention (days/wks) 3 days   Anticipated Equipment Needs at Discharge walker   Anticipated Discharge Disposition Home with Assist;Home with Outpatient Therapy   Risk & Benefits of therapy have been explained Yes   Patient, Family & other staff in agreement with plan of care Yes   Therapy Certification   Start of care date 07/18/20   Certification date from 07/18/20   Certification date to 07/21/20   Medical Diagnosis Mobility/falls   Certification I certify the need for these services furnished under this plan  "of treatment and while under my care.  (Physician co-signature of this document indicates review and certification of the therapy plan).    [facility] AM-PAC TM \"6 Clicks\"   2016, Trustees of [facility], under license to [employer].  All rights reserved.   6 Clicks Short Forms Basic Mobility Inpatient Short Form   [facility] AM-PAC  \"6 Clicks\" V.2 Basic Mobility Inpatient Short Form   1. Turning from your back to your side while in a flat bed without using bedrails? 4 - None   2. Moving from lying on your back to sitting on the side of a flat bed without using bedrails? 4 - None   3. Moving to and from a bed to a chair (including a wheelchair)? 4 - None   4. Standing up from a chair using your arms (e.g., wheelchair, or bedside chair)? 4 - None   5. To walk in hospital room? 4 - None   6. Climbing 3-5 steps with a railing? 4 - None   Basic Mobility Raw Score (Score out of 24.Lower scores equate to lower levels of function) 24   Total Evaluation Time   Total Evaluation Time (Minutes) 55     "

## 2020-07-18 NOTE — PHARMACY-ADMISSION MEDICATION HISTORY
Pharmacy Medication History  Admission medication history interview status for the 7/17/2020  admission is complete. See EPIC admission navigator for prior to admission medications     Medication history sources: Patient's family/friend (wife Puja)  Medication history source reliability: Good  Adherence assessment: Good    Significant changes made to the medication list:  Daughter reported patient's medications, however did not know doses or frequency. Doses/frequencies were updated previously via [employer]. Verified with wife this AM over the phone and updated doses.     Additional medication history information:   1. Wife reports that patient just started taking melatonin about a week ago and has been taking it every night since starting (not PRN).     Medication reconciliation completed by provider prior to medication history? No    Time spent in this activity: 5 min    Prior to Admission medications    Medication Sig Last Dose Taking? Auth Provider   donepezil (ARICEPT) 10 MG tablet Take 5 mg by mouth every morning (0.5 x 10 mg tablet) 7/17/2020 at am Yes Reported, Patient   melatonin 5 MG tablet Take 5 mg by mouth At Bedtime  7/16/2020 at hs Yes Unknown, Entered By History   sertraline (ZOLOFT) 100 MG tablet Take 100 mg by mouth every morning  7/17/2020 at am Yes Reported, Patient

## 2020-07-18 NOTE — ED PROVIDER NOTES
History     Chief Complaint:  Generalized weakness      HPI   History limited by dementia.    Bj Soares is a 88 year old male with a history of hypertension and Alzheimer's disease who presents with generalized weakness. Patient has been outside enjoying the day since 1100 this morning. EMS was called by family after noting patient was sweaty, unsteady on his feet, and pre-syncopal appearing. Family states he had not had anything to eat today. On EMS evaluation, BP was 209 systolic, HR in the 60s. Patient was able to ambulate with assistance to the ambulance where he then vomited. Patient was given 500 of normal saline. Nausea and vomiting resolved after Zofran. Pre-hospital EKG showed 1st degree AV block and possible left bundle branch block. Patient denies chest pain, chest pressure, or abdominal pain.     Allergies:  No known drug allergies      Medications:    Amlodipine  Aricept   Zoloft  Imodium     Past Medical History:    Alzheimer's disease  CKD  Depression  1st degree AV block  Hyperlipidemia  Hyperparathyroidism  Hypertension   Nephrolithiasis  Umbilical hernia  Vitamin D deficiency    Past Surgical History:    Bilateral cataract  Bilateral ESWL, ureteral stents  TURP     Family History:    History reviewed. No pertinent family history.      Social History:  Smoking status: Former  Alcohol use: No  Drug use: No  Patient presents alone.  PCP: Zelalem Hernandez    Marital Status:      Review of Systems   Unable to perform ROS: Dementia       Physical Exam     Patient Vitals for the past 24 hrs:   BP Temp Temp src Pulse Heart Rate Resp SpO2 Weight   07/17/20 2054 -- -- -- -- 53 15 97 % --   07/17/20 2040 (!) 177/86 -- -- 53 52 11 98 % --   07/17/20 1939 -- -- -- -- -- -- -- 83.5 kg (184 lb)   07/17/20 1922 (!) 193/80 98  F (36.7  C) Oral 62 -- 16 97 % --       Physical Exam  Physical Exam   General:  Sitting on bed with daughter at bedside.   HENT:  No obvious trauma to head  Right Ear:   External ear normal.   Left Ear:  External ear normal.   Nose:  Nose normal.   Eyes:  Conjunctivae and EOM are normal. Pupils are equal, round, and reactive.   Neck: Normal range of motion. Neck supple. No tracheal deviation present.   CV:  Normal heart sounds. No murmur heard.  Pulm/Chest: Effort normal and breath sounds normal.   Abd: Soft. No distension. There is no tenderness. There is no rigidity, no rebound and no guarding. Chronic indwelling Hewitt catheter in place.  M/S: Normal range of motion.   Neuro: Alert. GCS 14, pleasantly confused. CN II-XII Grossly intact, no pronator drift, normal finger-nose-finger, visual fields intact by confrontation. Muscle strength is +5 proximal and distal in the bilateral upper and lower extremities. No dysarthria. Normal palm up, palm down.  Skin: Skin is warm and dry. No rash noted. Not diaphoretic.   Psych: Normal mood and affect. Behavior is normal.     Emergency Department Course     ECG (19:31:51):  Rate 58 bpm. CA interval 264. QRS duration 152. QT/QTc 502/492. P-R-T axes * -41 91. Sinus bradycardia with 1st degree AV block. Left axis deviation. LBBB. No chest pain. New LBBB compared to EKG dated 7/8/19. Interpreted at 1932 by Bj Ayon DO.     Imaging:  Radiology findings were communicated with the family who voiced understanding of the findings.    CT-scan Head w/o contrast:  Chronic changes. No evidence for intracranial hemorrhage   or any acute process.   Result per radiology.     Laboratory:  Laboratory findings were communicated with the family who voiced understanding of the findings.    CBC: WBC 8.9, HGB 14.1,  (L)   BMP: Cl 111 (H), Glucose 109 (H), BUN 37 (H), Creatinine 2.9 (H), GFR 18 (L), Calcium 8.4 (L), o/w WNL  1930 Troponin I: <0.015    UA: Blood small, Albumin 300, Nitrite positive, Leukocyte Esterase large, WBC 56 (H), RBC 6 (H), Mucus present, Amorphous Crystals few, o/w Negative   Urine Culture: Pending    Interventions:  2010  - NS 1 L IV Bolus    Rocephin 1 g IV    Emergency Department Course:  The patient arrived in the emergency department via EMS.     Past medical records, nursing notes, and vitals reviewed.  1910: I performed an exam of the patient and obtained history, as documented above.    IV inserted and blood drawn. This was sent to laboratory for testing, findings above. The patient was sent for a head CT while in the emergency department, findings above. The patient provided a urine sample here in the emergency department. This was sent for laboratory testing, findings above.     1940: Spoke to family in the ED, updated them regarding patient.  Reviewed the patient's presentation, history, obtained more history, etc.  I reviewed the patient's EKG shows a left bundle branch block which appears to be new since last year.  The daughter also reports the patient was not complaining of chest pain.  I discussed with his symptoms we will consider activating the Cath Lab for a new left bundle branch block.  She reports with the patient's Alzheimer's that he does not want any aggressive procedures performed including angiography.  The patient continues to be asymptomatic without chest pain here.    2019: Findings and plan explained to the patient and daughter who consents to admission. Discussed the patient with Dr. Mcclendon, who will admit the patient to a obs bed for further monitoring, evaluation, and treatment.    I personally reviewed the laboratory and imaging results with the patient and daughter and answered all related questions prior to admission.     Impression & Plan     Medical Decision Making:  Bj Soares is a very pleasant 88 year old year old patient who presents to the emergency department with concern of generalized weakness and difficulty walking.  The patient was sitting outside in this significant heat and humidity from 11 AM until approximately 6:45 PM.  The patient has underlying Alzheimer's, recognizes  his wife and children, but is otherwise confused about events.  He is at his baseline mentation.  The patient denies pain.  The wife could not get the patient inside the house because he felt too weak.  EMS was called.  They were able to assist the patient he walked through the house outside to the rig.  The patient then had emesis.  The patient is slightly hypertensive here.  Other than being confused which is his baseline, he has no focal neurologic deficit.  Nonetheless, given the reported unsteady gait, I considered stroke and performed a plain CT given his history of kidney disease.  This shows no evidence of stroke or ICH as allowed by CT.  A screening EKG shows a left bundle branch block.  This is new compared to his EKG from a year ago.  He denies any chest pain, chest pressure, chest aching nor did he complain of any chest discomfort earlier in the day.  Troponin is negative.  The daughter reports the patient's wishes are that he would not want any aggressive intervention such as angiography, anyways.    The patient has underlying kidney disease but has slight renal sufficiency with a worse creatinine compared to his baseline.  He was provided some fluids.  He will be admitted to the hospital for observation for continued evaluation and treatment.  MRI could be considered if his symptoms do not improve with being indoors and fluids.  He does have a chronic indwelling Hewitt catheter.  Urine analysis shows evidence of urinary tract infection.  Urine culture is pending.  Prior urine culture showed >100k Serratia marcescens that was sensitive to Rocephin.  He is provided Rocephin here as well.  I reviewed this with the patient and daughter and they are in agreement.  I spoke to the hospitalist, Dr. Mcclendon, who has agreed to admit the patient for continued evaluation and treatment.    Diagnosis:    ICD-10-CM    1. Heat exhaustion, initial encounter  T67.5XXA UA with Microscopic   2. Dehydration  E86.0    3.  Renal insufficiency  N28.9    4. Unsteady gait  R26.81    5. Generalized weakness  R53.1    6. Urinary tract infection associated with indwelling urethral catheter, initial encounter (H)  T83.511A     N39.0      Disposition:  Admitted to obs.    Scribe Disclosure:  Erik MASSEY Chi, am serving as a scribe at 7:05 PM on 7/17/2020 to document services personally performed by Bj Ayon DO based on my observations and the provider's statements to me.      Erik Daly   7/17/2020    EMERGENCY DEPARTMENT     Bj Ayon DO  07/17/20 [id number]

## 2020-07-18 NOTE — PROGRESS NOTES
RECEIVING UNIT ED HANDOFF REVIEW    ED Nurse Handoff Report was reviewed by: Marianela Cook RN on July 17, 2020 at 9:47 PM

## 2020-07-18 NOTE — PROGRESS NOTES
Discharge Planner PT   Patient plan for discharge: Home  Current status: Modified [place] supine to/from sitting, Modified [place] stands no assistive device wide base of support and increased time, pivots and 250' walk no assistive device unsteady (DGI 13/24, < 20 indicates falls risk), 1 flight of stairs with 1 rail standby assist unsteady and 2 rails supervision via reciprocal pattern. Pt edu on falls risk, indication for a walker and OP PT for his 2mo LBP and his worsened balance. Pt thought although a little shaky that he moved fine.  Barriers to return to prior living situation: none  Recommendations for discharge: Home with standby assist on stairs, consistent rolling walker use, OP PT for LBP & imbalance.   Rationale for recommendations: Falls risk, impaired mobility and decreased self-awareness indicates skilled OP PT progressions.        Entered by: Brittany Dressler 07/18/2020 9:33 AM        Physical Therapy Discharge Summary    Reason for therapy discharge:    Discharged to home.    Progress towards therapy goal(s). See goals on Care Plan in [facility] electronic health record for goal details.  Goals not met.  Barriers to achieving goals:   discharge from facility.    Therapy recommendation(s):    Continued therapy is recommended.  Rationale/Recommendations:  See above. .

## 2020-07-18 NOTE — PROGRESS NOTES
Pt forgetful, disoriented to time and years.  A1 GB W, minimal assist. SBPs elevated, stayed within parameters.  Denied pain.  Regular diet, tolerated well.  Went over discharge instructions with pt and daughter sabiha.  Sent discharge medications home with pt.  PT left unit around 1415 accompanied by staff.  Pt stated he had all of his belongings.

## 2020-07-18 NOTE — ED NOTES
[facility]  ED Nurse Handoff Report    ED Chief complaint: Altered Mental Status      ED Diagnosis:   Final diagnoses:   Heat exhaustion, initial encounter   Dehydration   Renal insufficiency   Unsteady gait   Generalized weakness   Urinary tract infection associated with indwelling urethral catheter, initial encounter (H)       Code Status: DNR/DNi    Allergies: No Known Allergies    Patient Story: Pt presents to ED via EMS due to altered mental status.  Patient was sitting on porch since 1100 today, wife was concerned so called daughter.  Daughter than called EMS.  Patient has hx of EMS, but daughter reports that patient was weaker than normal.  Pt has chronic catheter, joyce draining as normal.  Focused Assessment:  Pt is alert, orientated to person and place.  CMS intact x4.  Dry mucous membranes, poor skin turgor, and slow cap refill.  Respirations even and regular.  HR WNL.      Treatments and/or interventions provided: see MAR  Patient's response to treatments and/or interventions:  Responded well.    To be done/followed up on inpatient unit:  n/a    Does this patient have any cognitive concerns?: Baseline dementia    Activity level - Baseline/Home:  Cane  Activity Level - Current:   Stand with Assist with cane/walker    Patient's Preferred language: English   Needed?: No    Isolation: None  Infection: Not Applicable  Bariatric?: No    Vital Signs:   Vitals:    07/17/20 1922 07/17/20 1939 07/17/20 2040 07/17/20 2054   BP: (!) 193/80  (!) 177/86    Pulse: 62  53    Resp: 16  11 15   Temp: 98  F (36.7  C)      TempSrc: Oral      SpO2: 97%  98% 97%   Weight:  83.5 kg (184 lb)         Cardiac Rhythm:     Was the PSS-3 completed:   Yes  What interventions are required if any?               Family Comments: daughter jose enrique at bedside  OBS brochure/video discussed/provided to patient/family: Yes              Name of person given brochure if not patient: n/a              Relationship to  patient: n/a    For the majority of the shift this patient's behavior was Green.   Behavioral interventions performed were tlc.    ED NURSE PHONE NUMBER: [phone]

## 2020-07-18 NOTE — DISCHARGE SUMMARY
[facility]    Hospitalist Discharge Summary       Date of Admission:  7/17/2020  Date of Discharge:  7/18/2020  Discharging Provider: Joaquín Curiel MD      Discharge Diagnoses   Probable urinary tract infection  Heat exhaustion  Mild acute kidney injury  New LBBB    Follow-ups Needed After Discharge   Follow-up Appointments     Follow-up and recommended labs and tests       Follow up with primary care provider, Zelalem Hernandez, within 7 days for   hospital follow- up. Check basic metabolic panel in one week  - Please make an appointment with outpatient physical therapy           Unresulted Labs Ordered in the Past 30 Days of this Admission     Date and Time Order Name Status Description    7/17/2020 2058 Urine Culture In process     7/17/2020 2015 Asymptomatic COVID-19 Virus (Coronavirus) by PCR In process       These results will be followed up by PCP    Hospital Course   Bj Soares is an 88 year old male with PMH of Alzheimer's dementia, hypertension, depression CKD stage IV, who was admitted on 7/17/2020 with generalized weakness, found to have heat exhaustion. A urinalysis was collected and appears grossly infected with positive nitrites and pyuria; due to his past history of Serratia marcescens/UTI previously, he will be discharged empirically with ciprofloxacin. He has baseline CKD4 with Creatinine around 2.6, and on admission his creatinine was elevated to 2.9. He received IVF and his Creatinine improved to 2.68 at discharge.    Generalized weakness  Secondary to above, CT head negative.  Treating underlying UTI and dehydration/heat education as above. Seen by PT on 7/18/2020, patient imprved and can follow up with outpatient PT.    New LBBB  Patient reported an episode when he felt he had trouble breathing but completely subsided.  Denies chest pain or chest pressure.  EKG shows LBBB which is new compared to 2019. Troponin is negative. Discussed with daughter, they would  not want any intervention for this.  She agreed to check troponin only in case of chest pain or other definitive cardiac symptoms so that he could be managed medically. Patient was started on baby aspirin this admission.  - Outpatient follow up    Consultations This Hospital Stay   SOCIAL WORK IP CONSULT  PHYSICAL THERAPY ADULT IP CONSULT    Code Status   No CPR- Do NOT Intubate    Time Spent on this Encounter   I, Joaquín Curiel, personally saw the patient today and spent approximately 25 minutes discharging this patient.       Joaquín Curiel MD  [facility]  ______________________________________________________________________    Physical Exam   Vital Signs: Temp: 98.5  F (36.9  C) Temp src: Oral BP: (!) 175/89 Pulse: 56 Heart Rate: 54 Resp: 18 SpO2: 94 % O2 Device: None (Room air)    Weight: 190 lbs 0 oz    Constitutional: Male in NAD  Eyes: PERRL, nonicteric, normal ocular movements  HEENT: Normocephalic, atraumatic, oral mucosa moist  Respiratory: Minimal crackles at the bases, no wheezing, otherwise clear  Cardiovascular: RRR, normal S1/2, no m/r/g  GI: No organomegaly, normoactive bowel sounds, nontender, nondistended  Skin: No rashes  Musculoskeletal: Moves all extremities  Neurologic: A&O to self and place  Psychiatric: Appropriate affect and mood       Primary Care Physician   Zelalem Hernandez    Discharge Disposition   Discharged to home  Condition at discharge: Stable    Significant Results and Procedures   Most Recent 3 CBC's:  Recent Labs   Lab Test 07/17/20  1930 11/21/19  0708 11/18/19 1924   WBC 8.9 7.7 8.0   HGB 14.1 13.4 13.9   MCV 89 88 88   * 146* 153     Most Recent 3 BMP's:  Recent Labs   Lab Test 07/18/20  0905 07/17/20 1930 11/20/19  0719    140 138   POTASSIUM 4.6 4.1 4.1   CHLORIDE 113* 111* 111*   CO2 24 21 22   BUN 34* 37* 43*   CR 2.68* 2.90* 2.56*   ANIONGAP 5 8 5   VIKY 8.5 8.4* 8.3*   GLC 93 109* 87     Most Recent 2 LFT's:No lab results found.,    Results for orders placed or performed during the hospital encounter of 07/17/20   Head CT w/o contrast    Narrative    CT SCAN OF THE HEAD WITHOUT CONTRAST   7/17/2020 7:51 PM     HISTORY: Unsteady gait, weakness since 11:00 am.    TECHNIQUE: Axial images of the head and coronal reformations without  IV contrast material. Radiation dose for this scan was reduced using  automated exposure control, adjustment of the mA and/or kV according  to patient size, or iterative reconstruction technique.    COMPARISON: None.    FINDINGS: Mild cerebral atrophy is present. Minimal nonspecific white  matter changes are present without mass effect. There is no evidence  for intracranial hemorrhage, mass effect, acute infarct, or skull  fracture.      Impression    IMPRESSION: Chronic changes. No evidence for intracranial hemorrhage  or any acute process.    GAMAL CASTILLO MD       Discharge Orders      Physical Therapy Referral      Reason for your hospital stay    You were hospitalized for a urinary tract infection     Activity    Your activity upon discharge: activity as tolerated     Follow-up and recommended labs and tests     Follow up with primary care provider, Zelalem Hernandez, within 7 days for hospital follow- up. Check basic metabolic panel in one week  - Please make an appointment with outpatient physical therapy     No CPR- Do NOT Intubate     Diet    Follow this diet upon discharge:      Regular Diet Adult     Discharge Medications   Current Discharge Medication List      START taking these medications    Details   aspirin (ASA) 81 MG EC tablet Take 1 tablet (81 mg) by mouth daily  Qty: 90 tablet, Refills: 0    Associated Diagnoses: LBBB (left bundle branch block)      ciprofloxacin (CIPRO) 500 MG tablet Take 1 tablet (500 mg) by mouth daily  Qty: 6 tablet, Refills: 0    Associated Diagnoses: Urinary tract infection without hematuria, site unspecified         CONTINUE these medications which have NOT CHANGED     Details   donepezil (ARICEPT) 10 MG tablet Take 5 mg by mouth every morning (0.5 x 10 mg tablet)      melatonin 5 MG tablet Take 5 mg by mouth At Bedtime       sertraline (ZOLOFT) 100 MG tablet Take 100 mg by mouth every morning            Allergies   No Known Allergies

## 2020-07-18 NOTE — ED TRIAGE NOTES
Pt found sitting on porch since 1100 this morning without eating or drinking.  Wife concerned, called daughter who then called EMS.  Pt hypertensive upon EMS arrival.  Hx of dementia.

## 2020-07-18 NOTE — PROGRESS NOTES
[facility]      OUTPATIENT PHYSICAL THERAPY EVALUATION  PLAN OF TREATMENT FOR OUTPATIENT REHABILITATION  (COMPLETE FOR INITIAL CLAIMS ONLY)  Patient's Last Name, First Name, M.I.  YOB: 1931  Bj Soares                        Provider's Name  [facility] Medical Record No.  [id number]                               Onset Date:  07/18/20   Start of Care Date:  07/18/20      Type:     _X_PT   ___OT   ___SLP Medical Diagnosis:  Mobility/falls                        PT Diagnosis:  Impaired mobility   Visits from SOC:  1   _________________________________________________________________________________  Plan of Treatment/Functional Goals    Planned Interventions:  ,    gait training, neuromuscular re-education, balance training, risk factor education, home program guidelines, progressive activity/exercise,       Goals: See Physical Therapy Goals on Care Plan in eSee/[employer] electronic health record.    Therapy Frequency: Daily  Predicted Duration of Therapy Intervention: 3 days  _________________________________________________________________________________    I CERTIFY THE NEED FOR THESE SERVICES FURNISHED UNDER        THIS PLAN OF TREATMENT AND WHILE UNDER MY CARE     (Physician co-signature of this document indicates review and certification of the therapy plan).                Certification date from: 07/18/20, Certification date to: 07/21/20    Referring Physician: Piotr Mcclendon MD             Initial Assessment        See Physical Therapy evaluation dated 07/18/20 in Epic electronic health record.

## 2020-07-18 NOTE — PLAN OF CARE
Summary:     DATE & TIME: 2300-0730  Cognitive Concerns/ Orientation : A&O x4  BEHAVIOR & AGGRESSION TOOL COLOR: green  CIWA SCORE: NA  ABNL VS/O2: hypertensive 182/95, 165/85  MOBILITY: Assist 1 gait belt and walker  PAIN MANAGMENT: Denies  DIET: Regular  BOWEL/BLADDER: Hew[place] in place  ABNL LAB/BG: creatine 2.9  DRAIN/DEVICES: PIV NS 100ml/hr, Hewitt Catheter  TELEMETRY RHYTHM: NA  SKIN: intact, flashed, tanned    TESTS/PROCEDURES: CT is done yesterday  D/C DAY/GOALS/PLACE:  Today, recommended to prior living arrangement once Adequately hydrated, safe discharge plan made.  Will have PT evaluation for safe discharge planning today  OTHER IMPORTANT INFO:   COMMIT TO SIT IS DONE AND SIGNED OFF: yes

## 2020-07-20 LAB
BACTERIA SPEC CULT: ABNORMAL
BACTERIA SPEC CULT: ABNORMAL
SPECIMEN SOURCE: ABNORMAL

## 2021-01-03 ENCOUNTER — HEALTH MAINTENANCE LETTER (OUTPATIENT)
Age: 86
End: 2021-01-03

## 2021-04-25 ENCOUNTER — HEALTH MAINTENANCE LETTER (OUTPATIENT)
Age: 86
End: 2021-04-25

## 2021-10-10 ENCOUNTER — HEALTH MAINTENANCE LETTER (OUTPATIENT)
Age: 86
End: 2021-10-10

## 2022-05-21 ENCOUNTER — HEALTH MAINTENANCE LETTER (OUTPATIENT)
Age: 87
End: 2022-05-21

## 2022-09-18 ENCOUNTER — HEALTH MAINTENANCE LETTER (OUTPATIENT)
Age: 87
End: 2022-09-18

## 2023-06-04 ENCOUNTER — HEALTH MAINTENANCE LETTER (OUTPATIENT)
Age: 88
End: 2023-06-04

## (undated) DEVICE — LASER FIBER HOLMIUM FLEXIVA 365UM M0068403920

## (undated) DEVICE — GUIDEWIRE ZIPWIRE STR STIFF .035"X150CM M006630222B0

## (undated) DEVICE — PAD CHUX UNDERPAD 23X24" 7136

## (undated) DEVICE — GUIDEWIRE SENSOR DUAL FLEX STR 0.035"X150CM M0066703080

## (undated) DEVICE — SOL WATER IRRIG 1000ML BOTTLE 2F7114

## (undated) DEVICE — CATH URETERAL FLEX TIP TIGERTAIL 06FRX70CM 139006

## (undated) DEVICE — CATH FOLEY 20FR 5ML SILVER COAT LATEX 0165SI20

## (undated) DEVICE — BASKET STONE RETRIEVAL NTNL ZERO TIP 1.9FRX90CM M0063901050

## (undated) DEVICE — INFLATION DEVICE ENCORE  26 PRESSURE GAUGE 20ML M0067101140

## (undated) DEVICE — PACK CYSTOSCOPY SBA15CYFSI

## (undated) DEVICE — GLOVE PROTEXIS W/NEU-THERA 8.0  2D73TE80

## (undated) DEVICE — WIRE GUIDE AMPLATZ SUPER STIFF 0.035"X145CM 46-524

## (undated) DEVICE — CATH BALLOON DILATATION UROMAX ULTRA 18FRX4CM M0062251020

## (undated) DEVICE — SOL WATER IRRIG 3000ML BAG 2B7117

## (undated) DEVICE — IMPLANTABLE DEVICE: Type: IMPLANTABLE DEVICE | Site: URETER | Status: NON-FUNCTIONAL

## (undated) DEVICE — BAG URINARY DRAIN 2000ML LF 154002

## (undated) RX ORDER — PROPOFOL 10 MG/ML
INJECTION, EMULSION INTRAVENOUS
Status: DISPENSED
Start: 2019-07-09

## (undated) RX ORDER — DEXAMETHASONE SODIUM PHOSPHATE 4 MG/ML
INJECTION, SOLUTION INTRA-ARTICULAR; INTRALESIONAL; INTRAMUSCULAR; INTRAVENOUS; SOFT TISSUE
Status: DISPENSED
Start: 2019-07-09

## (undated) RX ORDER — FENTANYL CITRATE 50 UG/ML
INJECTION, SOLUTION INTRAMUSCULAR; INTRAVENOUS
Status: DISPENSED
Start: 2019-07-09

## (undated) RX ORDER — ONDANSETRON 2 MG/ML
INJECTION INTRAMUSCULAR; INTRAVENOUS
Status: DISPENSED
Start: 2019-07-09

## (undated) RX ORDER — CEFAZOLIN SODIUM 1 G/3ML
INJECTION, POWDER, FOR SOLUTION INTRAMUSCULAR; INTRAVENOUS
Status: DISPENSED
Start: 2019-07-09

## (undated) RX ORDER — LIDOCAINE HYDROCHLORIDE 20 MG/ML
INJECTION, SOLUTION EPIDURAL; INFILTRATION; INTRACAUDAL; PERINEURAL
Status: DISPENSED
Start: 2019-07-09